# Patient Record
Sex: MALE | Employment: PART TIME | URBAN - METROPOLITAN AREA
[De-identification: names, ages, dates, MRNs, and addresses within clinical notes are randomized per-mention and may not be internally consistent; named-entity substitution may affect disease eponyms.]

---

## 2018-11-07 ENCOUNTER — HOSPITAL ENCOUNTER (EMERGENCY)
Age: 38
Discharge: HOME | End: 2018-11-07
Attending: EMERGENCY MEDICINE
Payer: SUBSIDIZED

## 2018-11-07 VITALS
HEIGHT: 67 IN | HEART RATE: 86 BPM | BODY MASS INDEX: 25.9 KG/M2 | OXYGEN SATURATION: 97 % | RESPIRATION RATE: 16 BRPM | SYSTOLIC BLOOD PRESSURE: 124 MMHG | DIASTOLIC BLOOD PRESSURE: 85 MMHG | TEMPERATURE: 99.5 F | WEIGHT: 165 LBS

## 2018-11-07 DIAGNOSIS — R42 DIZZINESS: Primary | ICD-10-CM

## 2018-11-07 LAB
ANION GAP SERPL CALC-SCNC: 10 MMOL/L (ref 5–15)
APPEARANCE UR: CLEAR
ATRIAL RATE: 93 BPM
BACTERIA URNS QL MICRO: NEGATIVE /HPF
BASOPHILS # BLD: 0.1 K/UL (ref 0–0.1)
BASOPHILS NFR BLD: 1 % (ref 0–1)
BILIRUB UR QL: NEGATIVE
BUN SERPL-MCNC: 18 MG/DL (ref 6–20)
BUN/CREAT SERPL: 18 (ref 12–20)
CALCIUM SERPL-MCNC: 9.9 MG/DL (ref 8.5–10.1)
CALCULATED P AXIS, ECG09: 52 DEGREES
CALCULATED R AXIS, ECG10: 78 DEGREES
CALCULATED T AXIS, ECG11: -2 DEGREES
CHLORIDE SERPL-SCNC: 106 MMOL/L (ref 97–108)
CO2 SERPL-SCNC: 21 MMOL/L (ref 21–32)
COLOR UR: NORMAL
COMMENT, HOLDF: NORMAL
CREAT SERPL-MCNC: 1.02 MG/DL (ref 0.7–1.3)
D DIMER PPP FEU-MCNC: <0.19 MG/L FEU (ref 0–0.65)
DIAGNOSIS, 93000: NORMAL
DIFFERENTIAL METHOD BLD: ABNORMAL
EOSINOPHIL # BLD: 0.1 K/UL (ref 0–0.4)
EOSINOPHIL NFR BLD: 2 % (ref 0–7)
EPITH CASTS URNS QL MICRO: NORMAL /LPF
ERYTHROCYTE [DISTWIDTH] IN BLOOD BY AUTOMATED COUNT: 12.1 % (ref 11.5–14.5)
GLUCOSE BLD STRIP.AUTO-MCNC: 117 MG/DL (ref 65–100)
GLUCOSE SERPL-MCNC: 114 MG/DL (ref 65–100)
GLUCOSE UR STRIP.AUTO-MCNC: NEGATIVE MG/DL
HCT VFR BLD AUTO: 46.8 % (ref 36.6–50.3)
HGB BLD-MCNC: 16 G/DL (ref 12.1–17)
HGB UR QL STRIP: NEGATIVE
HYALINE CASTS URNS QL MICRO: NORMAL /LPF (ref 0–5)
IMM GRANULOCYTES # BLD: 0.1 K/UL (ref 0–0.04)
IMM GRANULOCYTES NFR BLD AUTO: 1 % (ref 0–0.5)
KETONES UR QL STRIP.AUTO: NEGATIVE MG/DL
LEUKOCYTE ESTERASE UR QL STRIP.AUTO: NEGATIVE
LYMPHOCYTES # BLD: 2.3 K/UL (ref 0.8–3.5)
LYMPHOCYTES NFR BLD: 32 % (ref 12–49)
MCH RBC QN AUTO: 29.9 PG (ref 26–34)
MCHC RBC AUTO-ENTMCNC: 34.2 G/DL (ref 30–36.5)
MCV RBC AUTO: 87.3 FL (ref 80–99)
MONOCYTES # BLD: 0.5 K/UL (ref 0–1)
MONOCYTES NFR BLD: 6 % (ref 5–13)
NEUTS SEG # BLD: 4.3 K/UL (ref 1.8–8)
NEUTS SEG NFR BLD: 59 % (ref 32–75)
NITRITE UR QL STRIP.AUTO: NEGATIVE
NRBC # BLD: 0 K/UL (ref 0–0.01)
NRBC BLD-RTO: 0 PER 100 WBC
P-R INTERVAL, ECG05: 138 MS
PH UR STRIP: 6 [PH] (ref 5–8)
PLATELET # BLD AUTO: 206 K/UL (ref 150–400)
PMV BLD AUTO: 12.1 FL (ref 8.9–12.9)
POTASSIUM SERPL-SCNC: 4.5 MMOL/L (ref 3.5–5.1)
PROT UR STRIP-MCNC: NEGATIVE MG/DL
Q-T INTERVAL, ECG07: 350 MS
QRS DURATION, ECG06: 84 MS
QTC CALCULATION (BEZET), ECG08: 435 MS
RBC # BLD AUTO: 5.36 M/UL (ref 4.1–5.7)
RBC #/AREA URNS HPF: NORMAL /HPF (ref 0–5)
SAMPLES BEING HELD,HOLD: NORMAL
SERVICE CMNT-IMP: ABNORMAL
SODIUM SERPL-SCNC: 137 MMOL/L (ref 136–145)
SP GR UR REFRACTOMETRY: 1.02 (ref 1–1.03)
TROPONIN I SERPL-MCNC: <0.05 NG/ML
UROBILINOGEN UR QL STRIP.AUTO: 0.2 EU/DL (ref 0.2–1)
VENTRICULAR RATE, ECG03: 93 BPM
WBC # BLD AUTO: 7.4 K/UL (ref 4.1–11.1)
WBC URNS QL MICRO: NORMAL /HPF (ref 0–4)

## 2018-11-07 PROCEDURE — 99284 EMERGENCY DEPT VISIT MOD MDM: CPT

## 2018-11-07 PROCEDURE — 82962 GLUCOSE BLOOD TEST: CPT

## 2018-11-07 PROCEDURE — 36415 COLL VENOUS BLD VENIPUNCTURE: CPT | Performed by: EMERGENCY MEDICINE

## 2018-11-07 PROCEDURE — 74011250636 HC RX REV CODE- 250/636: Performed by: PHYSICIAN ASSISTANT

## 2018-11-07 PROCEDURE — 85379 FIBRIN DEGRADATION QUANT: CPT | Performed by: EMERGENCY MEDICINE

## 2018-11-07 PROCEDURE — 85025 COMPLETE CBC W/AUTO DIFF WBC: CPT | Performed by: PHYSICIAN ASSISTANT

## 2018-11-07 PROCEDURE — 93005 ELECTROCARDIOGRAM TRACING: CPT

## 2018-11-07 PROCEDURE — 80048 BASIC METABOLIC PNL TOTAL CA: CPT | Performed by: PHYSICIAN ASSISTANT

## 2018-11-07 PROCEDURE — 84484 ASSAY OF TROPONIN QUANT: CPT | Performed by: PHYSICIAN ASSISTANT

## 2018-11-07 PROCEDURE — 96360 HYDRATION IV INFUSION INIT: CPT

## 2018-11-07 PROCEDURE — 81001 URINALYSIS AUTO W/SCOPE: CPT | Performed by: PHYSICIAN ASSISTANT

## 2018-11-07 RX ADMIN — SODIUM CHLORIDE 1000 ML: 900 INJECTION, SOLUTION INTRAVENOUS at 10:03

## 2018-11-07 NOTE — PROGRESS NOTES
**[employer]** 
[employer] If patient needs to be admitted and STABLE- will need to be transferred to [facility] facility.

## 2018-11-07 NOTE — ED PROVIDER NOTES
43 y.o. male with past medical history significant for chronic low back pain presents with complaints of dizziness, fatigue, thrush on tongue, and bilateral blurry vision. The pt states that he has been experiencing these symptoms intermittently for the last year. He explained that while he was at work today he experienced worsening symptoms and wanted to be seen in the ED. Pt denies facial asymmetry, dysarthria, ataxia, unilateral weakness, or unilateral vision changes. There are no other acute medical complaints at this time. PCP: DO Liz Thakkar PA-C Past Medical History:  
Diagnosis Date  Chronic pain   
 back pain Past Surgical History:  
Procedure Laterality Date  HX HERNIA REPAIR Left   
 inguinal hernia repair  HX HERNIA REPAIR    
 as an infant No family history on file. Social History Socioeconomic History  Marital status: SINGLE Spouse name: Not on file  Number of children: Not on file  Years of education: Not on file  Highest education level: Not on file Social Needs  Financial resource strain: Not on file  Food insecurity - worry: Not on file  Food insecurity - inability: Not on file  Transportation needs - medical: Not on file  Transportation needs - non-medical: Not on file Occupational History  Not on file Tobacco Use  Smoking status: Former Smoker Years: 15.00  Smokeless tobacco: Never Used  Tobacco comment: 1 pack/week Substance and Sexual Activity  Alcohol use: Yes Alcohol/week: 0.0 oz Types: 4 - 5 Shots of liquor per week  Drug use: No  
 Sexual activity: Not on file Other Topics Concern  Not on file Social History Narrative  Not on file ALLERGIES: Benadryl [diphenhydramine hcl] Review of Systems Constitutional: Negative for chills, diaphoresis and fever. HENT: Negative for congestion, postnasal drip, rhinorrhea and sore throat. Eyes: Negative for photophobia, discharge, redness and visual disturbance. Respiratory: Negative for cough, chest tightness, shortness of breath and wheezing. Cardiovascular: Negative for chest pain, palpitations and leg swelling. Gastrointestinal: Negative for abdominal distention, abdominal pain, blood in stool, constipation, diarrhea, nausea and vomiting. Genitourinary: Negative for difficulty urinating, dysuria, frequency, hematuria and urgency. Musculoskeletal: Negative for arthralgias, back pain, joint swelling and myalgias. Skin: Negative for color change and rash. Neurological: Negative for dizziness, speech difficulty, weakness, light-headedness, numbness and headaches. Psychiatric/Behavioral: Negative for confusion. The patient is not nervous/anxious. All other systems reviewed and are negative. Vitals:  
 11/07/18 0929 11/07/18 0934 11/07/18 1147 BP:  (!) 150/96 124/85 Pulse: (!) 115 100 86 Resp:  16 16 Temp:  98.5 °F (36.9 °C) 99.5 °F (37.5 °C) SpO2: 97% 97% 97% Weight:  74.8 kg (165 lb) Height:  5' 7\" (1.702 m) Physical Exam  
Constitutional: He is oriented to person, place, and time. He appears well-developed and well-nourished. No distress. HENT:  
Head: Normocephalic and atraumatic. Head is without raccoon's eyes, without Escamilla's sign and without laceration. Right Ear: Hearing, tympanic membrane, external ear and ear canal normal. No foreign bodies. Tympanic membrane is not bulging. No hemotympanum. Left Ear: Hearing, tympanic membrane, external ear and ear canal normal. No foreign bodies. Tympanic membrane is not bulging. No hemotympanum. Nose: Nose normal. No mucosal edema or rhinorrhea. Right sinus exhibits no maxillary sinus tenderness and no frontal sinus tenderness. Left sinus exhibits no maxillary sinus tenderness and no frontal sinus tenderness.   
Mouth/Throat: Uvula is midline, oropharynx is clear and moist and mucous membranes are normal. No tonsillar abscesses. Eyes: Conjunctivae and EOM are normal. Pupils are equal, round, and reactive to light. Right eye exhibits no discharge. Left eye exhibits no discharge. Neck: Normal range of motion. Neck supple. Cardiovascular: Normal rate, regular rhythm and normal heart sounds. Exam reveals no gallop and no friction rub. No murmur heard. Regular rate and rhythm. No murmurs, gallops, rubs, or clicks. Pulmonary/Chest: Effort normal and breath sounds normal. No respiratory distress. He has no wheezes. He has no rales. No stridor or wheezes. No accessory muscle usage. No nasal flaring. Breath Sounds equal bilaterally. Abdominal: Soft. Bowel sounds are normal. He exhibits no distension. There is no tenderness. There is no rebound and no guarding. No abdominal Bruits. No pulsatile mass. No abdominal scars. Active bowel sounds. Musculoskeletal: Normal range of motion. He exhibits no edema, tenderness or deformity. Neurological: He is alert and oriented to person, place, and time. Skin: He is not diaphoretic. Nursing note and vitals reviewed. MDM Number of Diagnoses or Management Options Dizziness:  
Diagnosis management comments: Pt afebrile and nontoxic appearing. Labs, physical exam, and imaging studies all unremarkable. HEART score 2. No signs of PE, PTX, ACS, esophageal rupture, dissection, pancreatitis, appendicitis, cholecystitis/cholagnitis, bowel obstruction/perforation, sepsis or any other acute life threatening condition. Will treat symptomatically and advise close follow up with family doctor. Reviewed treatment plan with attending and they agree. Janett Neely PA-C Procedures

## 2018-11-07 NOTE — DISCHARGE INSTRUCTIONS
Dizziness: Care Instructions  Your Care Instructions  Dizziness is the feeling of unsteadiness or fuzziness in your head. It is different than having vertigo, which is a feeling that the room is spinning or that you are moving or falling. It is also different from lightheadedness, which is the feeling that you are about to faint. It can be hard to know what causes dizziness. Some people feel dizzy when they have migraine headaches. Sometimes bouts of flu can make you feel dizzy. Some medical conditions, such as heart problems or high blood pressure, can make you feel dizzy. Many medicines can cause dizziness, including medicines for high blood pressure, pain, or anxiety. If a medicine causes your symptoms, your doctor may recommend that you stop or change the medicine. If it is a problem with your heart, you may need medicine to help your heart work better. If there is no clear reason for your symptoms, your doctor may suggest watching and waiting for a while to see if the dizziness goes away on its own. Follow-up care is a key part of your treatment and safety. Be sure to make and go to all appointments, and call your doctor if you are having problems. It's also a good idea to know your test results and keep a list of the medicines you take. How can you care for yourself at home? · If your doctor recommends or prescribes medicine, take it exactly as directed. Call your doctor if you think you are having a problem with your medicine. · Do not drive while you feel dizzy. · Try to prevent falls. Steps you can take include:  ? Using nonskid mats, adding grab bars near the tub, and using night-lights. ? Clearing your home so that walkways are free of anything you might trip on.  ? Letting family and friends know that you have been feeling dizzy. This will help them know how to help you. When should you call for help? Call 911 anytime you think you may need emergency care.  For example, call if:    · You passed out (lost consciousness).     · You have dizziness along with symptoms of a heart attack. These may include:  ? Chest pain or pressure, or a strange feeling in the chest.  ? Sweating. ? Shortness of breath. ? Nausea or vomiting. ? Pain, pressure, or a strange feeling in the back, neck, jaw, or upper belly or in one or both shoulders or arms. ? Lightheadedness or sudden weakness. ? A fast or irregular heartbeat.     · You have symptoms of a stroke. These may include:  ? Sudden numbness, tingling, weakness, or loss of movement in your face, arm, or leg, especially on only one side of your body. ? Sudden vision changes. ? Sudden trouble speaking. ? Sudden confusion or trouble understanding simple statements. ? Sudden problems with walking or balance. ? A sudden, severe headache that is different from past headaches.    Call your doctor now or seek immediate medical care if:    · You feel dizzy and have a fever, headache, or ringing in your ears.     · You have new or increased nausea and vomiting.     · Your dizziness does not go away or comes back.    Watch closely for changes in your health, and be sure to contact your doctor if:    · You do not get better as expected. Where can you learn more? Go to http://dane-kristen.info/. Enter A590 in the search box to learn more about \"Dizziness: Care Instructions. \"  Current as of: November 20, 2017  Content Version: 11.8  © 8267-0199 [employer]. Care instructions adapted under license by [employer] (which disclaims liability or warranty for this information). If you have questions about a medical condition or this instruction, always ask your healthcare professional. [place] [address] any warranty or liability for your use of this information. We hope that we have addressed all of your medical concerns.  The examination and treatment you received in the Emergency Department were for an emergent problem and were not intended as complete care. It is important that you follow up with your healthcare provider(s) for ongoing care. If your symptoms worsen or do not improve as expected, and you are unable to reach your usual health care provider(s), you should return to the Emergency Department. Today's healthcare is undergoing tremendous change, and patient satisfaction surveys are one of the many tools to assess the quality of medical care. You may receive a survey from the [employer] organization regarding your experience in the Emergency Department. I hope that your experience has been completely positive, particularly the medical care that I provided. As such, please participate in the survey; anything less than excellent does not meet my expectations or intentions. [facility] and [facility] participate in nationally recognized quality of care measures. If your blood pressure is greater than 120/80, as reported below, we urge that you seek medical care to address the potential of high blood pressure, commonly known as hypertension. Hypertension can be hereditary or can be caused by certain medical conditions, pain, stress, or \"white coat syndrome. \"       Please make an appointment with your health care provider(s) for follow up of your Emergency Department visit. VITALS:   Patient Vitals for the past 8 hrs:   Temp Pulse Resp BP SpO2   11/07/18 1147 99.5 °F (37.5 °C) 86 16 124/85 97 %   11/07/18 0934 98.5 °F (36.9 °C) 100 16 (!) 150/96 97 %   11/07/18 0929 -- (!) 115 -- -- 97 %          Thank you for allowing us to provide you with medical care today. We realize that you have many choices for your emergency care needs. Please choose us in the future for any continued health care needs. Carroll De Jesus, [address]   Office: [phone]            Recent Results (from the past 24 hour(s))   EKG, 12 LEAD, INITIAL    Collection Time: 11/07/18  9:43 AM   Result Value Ref Range    Ventricular Rate 93 BPM    Atrial Rate 93 BPM    P-R Interval 138 ms    QRS Duration 84 ms    Q-T Interval 350 ms    QTC Calculation (Bezet) 435 ms    Calculated P Axis 52 degrees    Calculated R Axis 78 degrees    Calculated T Axis -2 degrees    Diagnosis       Normal sinus rhythm  Nonspecific T wave abnormality  When compared with ECG of 11-DEC-2015 14:37,  No significant change was found     CBC WITH AUTOMATED DIFF    Collection Time: 11/07/18  9:55 AM   Result Value Ref Range    WBC 7.4 4.1 - 11.1 K/uL    RBC 5.36 4.10 - 5.70 M/uL    HGB 16.0 12.1 - 17.0 g/dL    HCT 46.8 36.6 - 50.3 %    MCV 87.3 80.0 - 99.0 FL    MCH 29.9 26.0 - 34.0 PG    MCHC 34.2 30.0 - 36.5 g/dL    RDW 12.1 11.5 - 14.5 %    PLATELET 353 613 - 688 K/uL    MPV 12.1 8.9 - 12.9 FL    NRBC 0.0 0  WBC    ABSOLUTE NRBC 0.00 0.00 - 0.01 K/uL    NEUTROPHILS 59 32 - 75 %    LYMPHOCYTES 32 12 - 49 %    MONOCYTES 6 5 - 13 %    EOSINOPHILS 2 0 - 7 %    BASOPHILS 1 0 - 1 %    IMMATURE GRANULOCYTES 1 (H) 0.0 - 0.5 %    ABS. NEUTROPHILS 4.3 1.8 - 8.0 K/UL    ABS. LYMPHOCYTES 2.3 0.8 - 3.5 K/UL    ABS. MONOCYTES 0.5 0.0 - 1.0 K/UL    ABS. EOSINOPHILS 0.1 0.0 - 0.4 K/UL    ABS. BASOPHILS 0.1 0.0 - 0.1 K/UL    ABS. IMM.  GRANS. 0.1 (H) 0.00 - 0.04 K/UL    DF AUTOMATED     METABOLIC PANEL, BASIC    Collection Time: 11/07/18  9:55 AM   Result Value Ref Range    Sodium 137 136 - 145 mmol/L    Potassium 4.5 3.5 - 5.1 mmol/L    Chloride 106 97 - 108 mmol/L    CO2 21 21 - 32 mmol/L    Anion gap 10 5 - 15 mmol/L    Glucose 114 (H) 65 - 100 mg/dL    BUN 18 6 - 20 MG/DL    Creatinine 1.02 0.70 - 1.30 MG/DL    BUN/Creatinine ratio 18 12 - 20      GFR est AA >60 >60 ml/min/1.73m2    GFR est non-AA >60 >60 ml/min/1.73m2    Calcium 9.9 8.5 - 10.1 MG/DL   TROPONIN I    Collection Time: 11/07/18  9:55 AM   Result Value Ref Range    Troponin-I, Qt. <0.05 <0.05 ng/mL SAMPLES BEING HELD    Collection Time: 11/07/18  9:55 AM   Result Value Ref Range    SAMPLES BEING HELD 1RED      COMMENT        Add-on orders for these samples will be processed based on acceptable specimen integrity and analyte stability, which may vary by analyte. GLUCOSE, POC    Collection Time: 11/07/18 10:02 AM   Result Value Ref Range    Glucose (POC) 117 (H) 65 - 100 mg/dL    Performed by Shania Padgett W/MICROSCOPIC    Collection Time: 11/07/18 10:40 AM   Result Value Ref Range    Color YELLOW/STRAW      Appearance CLEAR CLEAR      Specific gravity 1.025 1.003 - 1.030      pH (UA) 6.0 5.0 - 8.0      Protein NEGATIVE  NEG mg/dL    Glucose NEGATIVE  NEG mg/dL    Ketone NEGATIVE  NEG mg/dL    Bilirubin NEGATIVE  NEG      Blood NEGATIVE  NEG      Urobilinogen 0.2 0.2 - 1.0 EU/dL    Nitrites NEGATIVE  NEG      Leukocyte Esterase NEGATIVE  NEG      WBC 0-4 0 - 4 /hpf    RBC 0-5 0 - 5 /hpf    Epithelial cells FEW FEW /lpf    Bacteria NEGATIVE  NEG /hpf    Hyaline cast 0-2 0 - 5 /lpf   D DIMER    Collection Time: 11/07/18 10:40 AM   Result Value Ref Range    D-dimer <0.19 0.00 - 0.65 mg/L FEU       No results found.

## 2019-03-28 ENCOUNTER — APPOINTMENT (OUTPATIENT)
Dept: MRI IMAGING | Age: 39
End: 2019-03-28
Attending: NURSE PRACTITIONER
Payer: SUBSIDIZED

## 2019-03-28 ENCOUNTER — HOSPITAL ENCOUNTER (OUTPATIENT)
Age: 39
Setting detail: OBSERVATION
Discharge: HOME | End: 2019-03-29
Attending: EMERGENCY MEDICINE | Admitting: INTERNAL MEDICINE
Payer: SUBSIDIZED

## 2019-03-28 ENCOUNTER — APPOINTMENT (OUTPATIENT)
Dept: CT IMAGING | Age: 39
End: 2019-03-28
Attending: EMERGENCY MEDICINE
Payer: SUBSIDIZED

## 2019-03-28 ENCOUNTER — APPOINTMENT (OUTPATIENT)
Dept: NON INVASIVE DIAGNOSTICS | Age: 39
End: 2019-03-28
Attending: INTERNAL MEDICINE
Payer: SUBSIDIZED

## 2019-03-28 ENCOUNTER — HOSPITAL ENCOUNTER (OUTPATIENT)
Dept: MRI IMAGING | Age: 39
Discharge: HOME | End: 2019-03-28
Attending: INTERNAL MEDICINE
Payer: SUBSIDIZED

## 2019-03-28 DIAGNOSIS — M48.062 LUMBAR STENOSIS WITH NEUROGENIC CLAUDICATION: ICD-10-CM

## 2019-03-28 DIAGNOSIS — G45.9 TIA (TRANSIENT ISCHEMIC ATTACK): Primary | ICD-10-CM

## 2019-03-28 DIAGNOSIS — R47.81 SLURRED SPEECH: ICD-10-CM

## 2019-03-28 PROBLEM — R03.0 ELEVATED BLOOD PRESSURE READING: Status: ACTIVE | Noted: 2019-03-28

## 2019-03-28 LAB
ALBUMIN SERPL-MCNC: 4.3 G/DL (ref 3.5–5)
ALBUMIN/GLOB SERPL: 1.6 {RATIO} (ref 1.1–2.2)
ALP SERPL-CCNC: 42 U/L (ref 45–117)
ALT SERPL-CCNC: 32 U/L (ref 12–78)
AMPHET UR QL SCN: NEGATIVE
ANION GAP SERPL CALC-SCNC: 7 MMOL/L (ref 5–15)
APPEARANCE UR: ABNORMAL
AST SERPL-CCNC: 14 U/L (ref 15–37)
BACTERIA URNS QL MICRO: NEGATIVE /HPF
BARBITURATES UR QL SCN: NEGATIVE
BASOPHILS # BLD: 0.1 K/UL (ref 0–0.1)
BASOPHILS NFR BLD: 1 % (ref 0–1)
BENZODIAZ UR QL: NEGATIVE
BILIRUB SERPL-MCNC: 0.3 MG/DL (ref 0.2–1)
BILIRUB UR QL: NEGATIVE
BUN SERPL-MCNC: 14 MG/DL (ref 6–20)
BUN/CREAT SERPL: 16 (ref 12–20)
CALCIUM SERPL-MCNC: 8.7 MG/DL (ref 8.5–10.1)
CANNABINOIDS UR QL SCN: NEGATIVE
CHLORIDE SERPL-SCNC: 106 MMOL/L (ref 97–108)
CHOLEST SERPL-MCNC: 195 MG/DL
CO2 SERPL-SCNC: 27 MMOL/L (ref 21–32)
COCAINE UR QL SCN: NEGATIVE
COLOR UR: ABNORMAL
COMMENT, HOLDF: NORMAL
CREAT SERPL-MCNC: 0.9 MG/DL (ref 0.7–1.3)
CRP SERPL HS-MCNC: 0.6 MG/L
DIFFERENTIAL METHOD BLD: ABNORMAL
DRUG SCRN COMMENT,DRGCM: ABNORMAL
ECHO AO ROOT DIAM: 3.34 CM
ECHO AV AREA PEAK VELOCITY: 2.3 CM2
ECHO AV PEAK GRADIENT: 4.3 MMHG
ECHO AV PEAK VELOCITY: 103.77 CM/S
ECHO EST RA PRESSURE: 3 MMHG
ECHO LA MAJOR AXIS: 3.3 CM
ECHO LA TO AORTIC ROOT RATIO: 0.99
ECHO LA VOL 2C: 46.82 ML (ref 18–58)
ECHO LA VOL 4C: 42.86 ML (ref 18–58)
ECHO LA VOL BP: 48.2 ML (ref 18–58)
ECHO LA VOL/BSA BIPLANE: 25.41 ML/M2 (ref 16–28)
ECHO LA VOLUME INDEX A2C: 24.69 ML/M2 (ref 16–28)
ECHO LA VOLUME INDEX A4C: 22.6 ML/M2 (ref 16–28)
ECHO LV INTERNAL DIMENSION DIASTOLIC: 4.75 CM (ref 4.2–5.9)
ECHO LV INTERNAL DIMENSION SYSTOLIC: 3.4 CM
ECHO LV IVSD: 0.83 CM (ref 0.6–1)
ECHO LV MASS 2D: 134.4 G (ref 88–224)
ECHO LV MASS INDEX 2D: 70.9 G/M2 (ref 49–115)
ECHO LV POSTERIOR WALL DIASTOLIC: 0.72 CM (ref 0.6–1)
ECHO LVOT DIAM: 1.96 CM
ECHO LVOT PEAK GRADIENT: 2.5 MMHG
ECHO LVOT PEAK VELOCITY: 78.95 CM/S
ECHO MV A VELOCITY: 50.8 CM/S
ECHO MV E DECELERATION TIME (DT): 198.1 MS
ECHO MV E VELOCITY: 80.24 CM/S
ECHO MV E/A RATIO: 1.58
ECHO MV REGURGITANT PEAK GRADIENT: 35.7 MMHG
ECHO MV REGURGITANT PEAK VELOCITY: 298.94 CM/S
ECHO PULMONARY ARTERY SYSTOLIC PRESSURE (PASP): 26.5 MMHG
ECHO RIGHT VENTRICULAR SYSTOLIC PRESSURE (RVSP): 26.5 MMHG
ECHO RV INTERNAL DIMENSION: 3.49 CM
ECHO TV REGURGITANT MAX VELOCITY: 242.44 CM/S
ECHO TV REGURGITANT PEAK GRADIENT: 23.5 MMHG
EOSINOPHIL # BLD: 0.3 K/UL (ref 0–0.4)
EOSINOPHIL NFR BLD: 5 % (ref 0–7)
EPITH CASTS URNS QL MICRO: ABNORMAL /LPF
ERYTHROCYTE [DISTWIDTH] IN BLOOD BY AUTOMATED COUNT: 12.6 % (ref 11.5–14.5)
EST. AVERAGE GLUCOSE BLD GHB EST-MCNC: 114 MG/DL
ETHANOL SERPL-MCNC: <10 MG/DL
GLOBULIN SER CALC-MCNC: 2.7 G/DL (ref 2–4)
GLUCOSE BLD STRIP.AUTO-MCNC: 107 MG/DL (ref 65–100)
GLUCOSE SERPL-MCNC: 100 MG/DL (ref 65–100)
GLUCOSE UR STRIP.AUTO-MCNC: NEGATIVE MG/DL
HBA1C MFR BLD: 5.6 % (ref 4.2–6.3)
HCT VFR BLD AUTO: 42.9 % (ref 36.6–50.3)
HDLC SERPL-MCNC: 45 MG/DL
HDLC SERPL: 4.3 {RATIO} (ref 0–5)
HGB BLD-MCNC: 14.4 G/DL (ref 12.1–17)
HGB UR QL STRIP: NEGATIVE
HYALINE CASTS URNS QL MICRO: ABNORMAL /LPF (ref 0–5)
IMM GRANULOCYTES # BLD AUTO: 0 K/UL (ref 0–0.04)
IMM GRANULOCYTES NFR BLD AUTO: 1 % (ref 0–0.5)
KETONES UR QL STRIP.AUTO: NEGATIVE MG/DL
LDLC SERPL CALC-MCNC: 106.4 MG/DL (ref 0–100)
LEUKOCYTE ESTERASE UR QL STRIP.AUTO: NEGATIVE
LIPID PROFILE,FLP: ABNORMAL
LYMPHOCYTES # BLD: 2.3 K/UL (ref 0.8–3.5)
LYMPHOCYTES NFR BLD: 42 % (ref 12–49)
MCH RBC QN AUTO: 30.2 PG (ref 26–34)
MCHC RBC AUTO-ENTMCNC: 33.6 G/DL (ref 30–36.5)
MCV RBC AUTO: 89.9 FL (ref 80–99)
METHADONE UR QL: NEGATIVE
MONOCYTES # BLD: 0.3 K/UL (ref 0–1)
MONOCYTES NFR BLD: 6 % (ref 5–13)
NEUTS SEG # BLD: 2.5 K/UL (ref 1.8–8)
NEUTS SEG NFR BLD: 45 % (ref 32–75)
NITRITE UR QL STRIP.AUTO: NEGATIVE
NRBC # BLD: 0 K/UL (ref 0–0.01)
NRBC BLD-RTO: 0 PER 100 WBC
OPIATES UR QL: POSITIVE
PCP UR QL: NEGATIVE
PH UR STRIP: 6 [PH] (ref 5–8)
PLATELET # BLD AUTO: 213 K/UL (ref 150–400)
PMV BLD AUTO: 11.4 FL (ref 8.9–12.9)
POTASSIUM SERPL-SCNC: 3.4 MMOL/L (ref 3.5–5.1)
PROT SERPL-MCNC: 7 G/DL (ref 6.4–8.2)
PROT UR STRIP-MCNC: ABNORMAL MG/DL
RBC # BLD AUTO: 4.77 M/UL (ref 4.1–5.7)
RBC #/AREA URNS HPF: ABNORMAL /HPF (ref 0–5)
SAMPLES BEING HELD,HOLD: NORMAL
SERVICE CMNT-IMP: ABNORMAL
SODIUM SERPL-SCNC: 140 MMOL/L (ref 136–145)
SP GR UR REFRACTOMETRY: 1.03 (ref 1–1.03)
TRIGL SERPL-MCNC: 218 MG/DL (ref ?–150)
TSH SERPL DL<=0.05 MIU/L-ACNC: 0.6 UIU/ML (ref 0.36–3.74)
UROBILINOGEN UR QL STRIP.AUTO: 0.2 EU/DL (ref 0.2–1)
VIT B12 SERPL-MCNC: 632 PG/ML (ref 193–986)
VLDLC SERPL CALC-MCNC: 43.6 MG/DL
WBC # BLD AUTO: 5.5 K/UL (ref 4.1–11.1)
WBC URNS QL MICRO: ABNORMAL /HPF (ref 0–4)

## 2019-03-28 PROCEDURE — 97116 GAIT TRAINING THERAPY: CPT

## 2019-03-28 PROCEDURE — 86141 C-REACTIVE PROTEIN HS: CPT

## 2019-03-28 PROCEDURE — 70450 CT HEAD/BRAIN W/O DYE: CPT

## 2019-03-28 PROCEDURE — 80061 LIPID PANEL: CPT

## 2019-03-28 PROCEDURE — 93005 ELECTROCARDIOGRAM TRACING: CPT

## 2019-03-28 PROCEDURE — 81001 URINALYSIS AUTO W/SCOPE: CPT

## 2019-03-28 PROCEDURE — 99218 HC RM OBSERVATION: CPT

## 2019-03-28 PROCEDURE — 97161 PT EVAL LOW COMPLEX 20 MIN: CPT

## 2019-03-28 PROCEDURE — 74011250637 HC RX REV CODE- 250/637: Performed by: NURSE PRACTITIONER

## 2019-03-28 PROCEDURE — 82607 VITAMIN B-12: CPT

## 2019-03-28 PROCEDURE — 85025 COMPLETE CBC W/AUTO DIFF WBC: CPT

## 2019-03-28 PROCEDURE — 80307 DRUG TEST PRSMV CHEM ANLYZR: CPT

## 2019-03-28 PROCEDURE — 93306 TTE W/DOPPLER COMPLETE: CPT

## 2019-03-28 PROCEDURE — 74011250637 HC RX REV CODE- 250/637: Performed by: INTERNAL MEDICINE

## 2019-03-28 PROCEDURE — 84443 ASSAY THYROID STIM HORMONE: CPT

## 2019-03-28 PROCEDURE — 92610 EVALUATE SWALLOWING FUNCTION: CPT

## 2019-03-28 PROCEDURE — 70551 MRI BRAIN STEM W/O DYE: CPT

## 2019-03-28 PROCEDURE — 36415 COLL VENOUS BLD VENIPUNCTURE: CPT

## 2019-03-28 PROCEDURE — 72148 MRI LUMBAR SPINE W/O DYE: CPT

## 2019-03-28 PROCEDURE — 80053 COMPREHEN METABOLIC PANEL: CPT

## 2019-03-28 PROCEDURE — 87086 URINE CULTURE/COLONY COUNT: CPT

## 2019-03-28 PROCEDURE — 99285 EMERGENCY DEPT VISIT HI MDM: CPT

## 2019-03-28 PROCEDURE — 74011250637 HC RX REV CODE- 250/637: Performed by: PSYCHIATRY & NEUROLOGY

## 2019-03-28 PROCEDURE — 83036 HEMOGLOBIN GLYCOSYLATED A1C: CPT

## 2019-03-28 PROCEDURE — 82962 GLUCOSE BLOOD TEST: CPT

## 2019-03-28 PROCEDURE — 74011250636 HC RX REV CODE- 250/636: Performed by: INTERNAL MEDICINE

## 2019-03-28 RX ORDER — GUAIFENESIN 100 MG/5ML
81 LIQUID (ML) ORAL DAILY
Status: DISCONTINUED | OUTPATIENT
Start: 2019-03-28 | End: 2019-03-29 | Stop reason: HOSPADM

## 2019-03-28 RX ORDER — ACETAMINOPHEN 325 MG/1
650 TABLET ORAL
Status: DISCONTINUED | OUTPATIENT
Start: 2019-03-28 | End: 2019-03-29 | Stop reason: HOSPADM

## 2019-03-28 RX ORDER — SODIUM CHLORIDE 0.9 % (FLUSH) 0.9 %
5-40 SYRINGE (ML) INJECTION AS NEEDED
Status: DISCONTINUED | OUTPATIENT
Start: 2019-03-28 | End: 2019-03-29 | Stop reason: HOSPADM

## 2019-03-28 RX ORDER — ENOXAPARIN SODIUM 100 MG/ML
40 INJECTION SUBCUTANEOUS EVERY 24 HOURS
Status: DISCONTINUED | OUTPATIENT
Start: 2019-03-28 | End: 2019-03-29 | Stop reason: HOSPADM

## 2019-03-28 RX ORDER — LORAZEPAM 1 MG/1
2 TABLET ORAL ONCE
Status: COMPLETED | OUTPATIENT
Start: 2019-03-28 | End: 2019-03-28

## 2019-03-28 RX ORDER — THERA TABS 400 MCG
1 TAB ORAL DAILY
COMMUNITY

## 2019-03-28 RX ORDER — GUAIFENESIN 100 MG/5ML
81 LIQUID (ML) ORAL DAILY
Status: DISCONTINUED | OUTPATIENT
Start: 2019-03-29 | End: 2019-03-28

## 2019-03-28 RX ORDER — POTASSIUM CHLORIDE AND SODIUM CHLORIDE 900; 300 MG/100ML; MG/100ML
INJECTION, SOLUTION INTRAVENOUS CONTINUOUS
Status: DISCONTINUED | OUTPATIENT
Start: 2019-03-28 | End: 2019-03-29 | Stop reason: HOSPADM

## 2019-03-28 RX ORDER — OXYCODONE AND ACETAMINOPHEN 7.5; 325 MG/1; MG/1
1 TABLET ORAL
COMMUNITY
End: 2020-03-06

## 2019-03-28 RX ORDER — SODIUM CHLORIDE 0.9 % (FLUSH) 0.9 %
5-40 SYRINGE (ML) INJECTION EVERY 8 HOURS
Status: DISCONTINUED | OUTPATIENT
Start: 2019-03-28 | End: 2019-03-29 | Stop reason: HOSPADM

## 2019-03-28 RX ORDER — POTASSIUM CHLORIDE 750 MG/1
40 TABLET, FILM COATED, EXTENDED RELEASE ORAL EVERY 4 HOURS
Status: DISPENSED | OUTPATIENT
Start: 2019-03-28 | End: 2019-03-28

## 2019-03-28 RX ORDER — FAMOTIDINE 20 MG/1
20 TABLET, FILM COATED ORAL 2 TIMES DAILY
Status: DISCONTINUED | OUTPATIENT
Start: 2019-03-28 | End: 2019-03-29 | Stop reason: HOSPADM

## 2019-03-28 RX ORDER — ONDANSETRON 2 MG/ML
4 INJECTION INTRAMUSCULAR; INTRAVENOUS
Status: DISCONTINUED | OUTPATIENT
Start: 2019-03-28 | End: 2019-03-29 | Stop reason: HOSPADM

## 2019-03-28 RX ORDER — ASPIRIN 325 MG
325 TABLET ORAL DAILY
Status: DISCONTINUED | OUTPATIENT
Start: 2019-03-28 | End: 2019-03-28

## 2019-03-28 RX ORDER — CARISOPRODOL 250 MG/1
250 TABLET ORAL
COMMUNITY
End: 2020-03-06

## 2019-03-28 RX ORDER — ATORVASTATIN CALCIUM 20 MG/1
40 TABLET, FILM COATED ORAL
Status: DISCONTINUED | OUTPATIENT
Start: 2019-03-28 | End: 2019-03-29 | Stop reason: HOSPADM

## 2019-03-28 RX ORDER — ALPRAZOLAM 1 MG/1
1 TABLET ORAL DAILY
COMMUNITY

## 2019-03-28 RX ADMIN — ATORVASTATIN CALCIUM 40 MG: 20 TABLET, FILM COATED ORAL at 22:33

## 2019-03-28 RX ADMIN — POTASSIUM CHLORIDE 40 MEQ: 750 TABLET, EXTENDED RELEASE ORAL at 14:48

## 2019-03-28 RX ADMIN — FAMOTIDINE 20 MG: 20 TABLET ORAL at 22:33

## 2019-03-28 RX ADMIN — POTASSIUM CHLORIDE 40 MEQ: 750 TABLET, EXTENDED RELEASE ORAL at 18:36

## 2019-03-28 RX ADMIN — ASPIRIN 81 MG 81 MG: 81 TABLET ORAL at 14:48

## 2019-03-28 RX ADMIN — LORAZEPAM 2 MG: 1 TABLET ORAL at 18:36

## 2019-03-28 RX ADMIN — SODIUM CHLORIDE AND POTASSIUM CHLORIDE: 9; 2.98 INJECTION, SOLUTION INTRAVENOUS at 15:02

## 2019-03-28 RX ADMIN — Medication 10 ML: at 22:33

## 2019-03-28 RX ADMIN — ENOXAPARIN SODIUM 40 MG: 100 INJECTION SUBCUTANEOUS at 22:33

## 2019-03-28 NOTE — PROGRESS NOTES
Please complete MRI History and Safety Screening Form for this patient Using [employer] only under Orders Requiring a Screening Form: 
Example: 
Orders Requiring a Screening Form Procedure                    Order Status                      Form Status MRI EXAM                   Active                                In Progress Click on blue hyperlink as shown above to launch MRI screening form Answer all questions completely including Weight, Surgery, and Implant History. Document MUST be \"eSigned\" using a \"Signature Pad\" by the person completing form.  (patient and RN or MD completing form with the patient) Patient cannot be scanned until this form is completed and reviewed in MRI to ensure patient is SAFE and eligible for MRI. Call MRI when this has been successfully completed at [phone]. This must be done under [employer]AR[employer]. Do not use the Nursing Flowsheet.

## 2019-03-28 NOTE — PROGRESS NOTES
physical Therapy neuro EVALUATION/discharge Patient: Lorne Santamaria (51 y.o. male) Date: 3/28/2019 Primary Diagnosis: Slurred speech [R47.81] Precautions:   Fall ASSESSMENT : 
Based on the objective data described below, the patient presents with slurred speech and weakness that has now resolved. Patient now with 5/5 strength in bilateral UE and LE. Patient without loss of balance or instability with upright activity. Patient is Independent for all upright mobility. He demonstrates no loss of balance. He was able to ambulate 250 feet with without an assistive device. Patient able to negotiate 4 steps with no hand rails. Patient scored a 56/56 on the KASPER. Patient does have chronic low back pain. Patient has been evaluated by neurology, CVA is unlikely per them. CT is negative, but awaiting MRI. Patient is currently at his baseline for mobility. Further skilled acute physical therapy is not indicated at this time. PLAN : 
Discharge Recommendations: None Further Equipment Recommendations for Discharge: none SUBJECTIVE:  
Patient stated i am better.  OBJECTIVE DATA SUMMARY:  
HISTORY:   
Past Medical History:  
Diagnosis Date  Chronic pain   
 back pain  GERD (gastroesophageal reflux disease) Past Surgical History:  
Procedure Laterality Date  HX HERNIA REPAIR Left   
 inguinal hernia repair  HX HERNIA REPAIR    
 as an infant Prior Level of Function/Home Situation: see above Personal factors and/or comorbidities impacting plan of care: see below Home Situation Home Environment: Private residence # Steps to Enter: 4 Rails to Enter: Yes Hand Rails : Left One/Two Story Residence: Two story # of Interior Steps: 15 Interior Rails: Both Lift Chair Available: No 
Living Alone: No 
Support Systems: Family member(s) Patient Expects to be Discharged to[de-identified] Private residence Current DME Used/Available at Home: None EXAMINATION/PRESENTATION/DECISION MAKING: Critical Behavior: 
Neurologic State: Alert Orientation Level: Oriented X4 Cognition: Appropriate decision making Safety/Judgement: Insight into deficits Hearing: Auditory Auditory Impairment: None Skin:  All exposed intact Edema: none noted Range Of Motion: 
AROM: Within functional limits PROM: Within functional limits Strength:   
Strength: Within functional limits Tone & Sensation:  
Tone: Normal 
  
  
  
  
Sensation: Intact Coordination: 
Coordination: Within functional limits Vision:  
  
Functional Mobility: 
Bed Mobility: 
Rolling: Independent Supine to Sit: Independent Sit to Supine: Independent Transfers: 
Sit to Stand: Independent Stand to Sit: Independent Bed to Chair: Independent Balance:  
Sitting: Intact Standing: Intact Ambulation/Gait Training: 
Distance (ft): 250 Feet (ft) Assistive Device: Gait belt Ambulation - Level of Assistance: Independent Gait Description (WDL): Exceptions to [facility] Stair Training: 
Number of Stairs Trained: 4 Stairs - Level of Assistance: Independent Rail Use: None Therapeutic Exercises:  
 
 
Functional Measure: 
Cobb Balance Test: 
 
Sitting to Standin Standing Unsupported: 4 Sitting with Back Unsupported: 4 Standing to Sittin Transfers: 4 Standing Unsupported with Eyes Closed: 4 Standing Unsupported with Feet Together: 4 Reach Forward with Outstretched Arm: 4  Object: 4 Turn to Look Over Shoulders: 4 Turn 360 Degrees: 4 Alternate Foot on Step/Stool: 4 Standing Unsupported One Foot in Front: 4 Stand on One Le Total: 56 
 
 
 
56=Maximum possible score;  
0-20=High fall risk 21-40=Moderate fall risk 41-56=Low fall risk Physical Therapy Evaluation Charge Determination History Examination Presentation Decision-Making MEDIUM  Complexity : 1-2 comorbidities / personal factors will impact the outcome/ POC  LOW Complexity : 1-2 Standardized tests and measures addressing body structure, function, activity limitation and / or participation in recreation  LOW Complexity : Stable, uncomplicated  Other outcome measures KASPER  LOW Based on the above components, the patient evaluation is determined to be of the following complexity level: LOW Pain: 
Pain Scale 1: Numeric (0 - 10) Pain Intensity 1: 0 Activity Tolerance: No complications with upright activity Please refer to the flowsheet for vital signs taken during this treatment. After treatment:  
[]         Patient left in no apparent distress sitting up in chair 
[x]         Patient left in no apparent distress in bed 
[x]         Call bell left within reach [x]         Nursing notified 
[x]         Caregiver present 
[]         Bed alarm activated COMMUNICATION/EDUCATION:  
Patient was educated regarding His deficit(s) of resolved self-reported slurred speech and weakness as this relates to His diagnosis of ?? Neurology has seen and said not TIA or CVA, but no diagnosis given. Patient and/or family was verbally educated on the BE FAST acronym for signs/symptoms of CVA and TIA. - no applicable, not CVA or TIA [x]   Fall prevention education was provided and the patient/caregiver indicated understanding. [x]   Patient/family have participated as able and agree with findings and recommendations. []   Patient is unable to participate in plan of care at this time. Findings and recommendations were discussed with: Registered Nurse Thank you for this referral. 
Ken Ware, PT, DPT Time Calculation: 15 mins

## 2019-03-28 NOTE — ED PROVIDER NOTES
35 y.o. male with past medical history significant for herniated disc, who presents ambulatory with family member to the ED with cc of dysarthria. Pt reports onset of dysarthria described as \"slurred speech,\" \"stuttering,\" and \"it was difficult to get my words out\" at ~8:30AM after he stepped out of the shower. He reports associated confusion, dizziness, and generalized weakness worst to his bilateral lower extremities equally, noting he felt \"shaky\" while ambulating. At present, he states his speech has improved since onset but is still not at baseline. Pt reports he had similar episode with milder symptoms ~4 months ago. Of note, he endorses history of herniated disc at L4-L5 and \"issues\" with cervical spine. He notes daily use of carbamazepine and percocet. Pt specifically denies any numbness, nausea, vomiting, chest pain, shortness of breath, fevers, cough, or congestion. There are no other acute medical concerns at this time. Social Hx: former Tobacco use, yes EtOH use, denies Illicit Drug use PCP: Jose Hutchison DO Note written by Hari Lieberman, as dictated by Tami Ambriz MD 9:34 AM 
 
 
The history is provided by the patient. No  was used. Past Medical History:  
Diagnosis Date  Chronic pain   
 back pain Past Surgical History:  
Procedure Laterality Date  HX HERNIA REPAIR Left   
 inguinal hernia repair  HX HERNIA REPAIR    
 as an infant No family history on file. Social History Socioeconomic History  Marital status: SINGLE Spouse name: Not on file  Number of children: Not on file  Years of education: Not on file  Highest education level: Not on file Occupational History  Not on file Social Needs  Financial resource strain: Not on file  Food insecurity:  
  Worry: Not on file Inability: Not on file  Transportation needs:  
  Medical: Not on file Non-medical: Not on file Tobacco Use  
  Smoking status: Former Smoker Years: 15.00  Smokeless tobacco: Never Used  Tobacco comment: 1 pack/week Substance and Sexual Activity  Alcohol use: Yes Alcohol/week: 0.0 oz Types: 4 - 5 Shots of liquor per week  Drug use: No  
 Sexual activity: Not on file Lifestyle  Physical activity:  
  Days per week: Not on file Minutes per session: Not on file  Stress: Not on file Relationships  Social connections:  
  Talks on phone: Not on file Gets together: Not on file Attends Druze service: Not on file Active member of club or organization: Not on file Attends meetings of clubs or organizations: Not on file Relationship status: Not on file  Intimate partner violence:  
  Fear of current or ex partner: Not on file Emotionally abused: Not on file Physically abused: Not on file Forced sexual activity: Not on file Other Topics Concern  Not on file Social History Narrative  Not on file ALLERGIES: Benadryl [diphenhydramine hcl] Review of Systems Constitutional: Negative for fever. HENT: Negative for congestion. Respiratory: Negative for cough and shortness of breath. Cardiovascular: Negative for chest pain. Gastrointestinal: Negative for nausea and vomiting. Neurological: Positive for dizziness, speech difficulty and weakness (BL lower extremities > generalized). Negative for numbness. Psychiatric/Behavioral: Positive for confusion. All other systems reviewed and are negative. Vitals:  
 03/28/19 9466 BP: (!) 168/105 Pulse: 97 Resp: 16 Temp: 99 °F (37.2 °C) SpO2: 100% Physical Exam  
Constitutional: He is oriented to person, place, and time. He appears well-developed and well-nourished. No distress. HENT:  
Head: Normocephalic and atraumatic. Eyes: Conjunctivae are normal. No scleral icterus. Neck: Neck supple. No tracheal deviation present. Cardiovascular: Normal rate, regular rhythm, normal heart sounds and intact distal pulses. Exam reveals no gallop and no friction rub. No murmur heard. Pulmonary/Chest: Effort normal and breath sounds normal. He has no wheezes. He has no rales. Abdominal: Soft. He exhibits no distension. There is no tenderness. There is no rebound and no guarding. Musculoskeletal: He exhibits no edema. Neurological: He is alert and oriented to person, place, and time. He has normal strength. No cranial nerve deficit or sensory deficit. Skin: Skin is warm and dry. No rash noted. Psychiatric: He has a normal mood and affect. Nursing note and vitals reviewed. Note written by Hari Marion, as dictated by Vinicio Demarco MD 9:34 AM 
 
MDM Procedures CONSULT NOTE: 
9:38 AM Vinicio Demarco MD spoke with Dr. Anurag Woodall for Neurology. Discussed available diagnostic tests and clinical findings. Dr. Tamy Jackson will evaluate pt via monitor. ED EKG interpretation: 
NSR; rate 88; normal ST, T.  
Note written by Cat Bowser, as dictated by Vinicio Demarco MD  9:58 AM 
 
10:24 AM 
Dr. Tamy Jackson called back; recommends admission for TIA evaluation. Roxana Peaks CONSULT NOTE: 
10:59 AM Vinicio Demarco MD spoke with Dr. Lamonte Bumpers, Consult for Hospitalist.  Discussed available diagnostic tests and clinical findings. Dr. Lamonte Bumpers will admit. ADMIT NOTE: 
11:00 AM 
Patient is being admitted to the hospital. 
 
11:07 AM 
Re-assessed pt. Pt reports he feels better now, almost at baseline, speech resolved.  
 
A/P: trouble with speech earlier - unclear etiology; normal neuro exam; CT neg; teleneurology evaluated and recommends admission for TIA eval.  Nick Altman MD

## 2019-03-28 NOTE — PROGRESS NOTES
[facility]: MED RECONCILIATION Comments/Recommendations:  
Patient alert and oriented for medication reconciliation. Oxycodone/APAP prescribed for ruptured discs per patient. Patient does not use his xanax very often, maybe once weekly. Confirmed allergy-patient states benadryl made him feel like his \"skin was crawling\" Updated preferred pharmacy as [facility] on [address]. Medications added:  
 
Oxycodone/APAP Carisoprodol MVI 
alprazolam 
 
Medications removed: 
 
Famotidine Hydrocodone/APAP Medications adjusted: 
 
none Information obtained from: patient, [place]  Allergies: Benadryl [diphenhydramine hcl] Prior to Admission Medications:  
 
 
Prior to Admission Medications Prescriptions Last Dose Informant Patient Reported? Taking? ALPRAZolam (XANAX) 1 mg tablet 3/27/2019 Self Yes Yes Sig: Take 0.5 mg by mouth daily as needed for Anxiety. carisoprodol (SOMA) 250 mg tablet 3/27/2019 Self Yes Yes Sig: Take 250 mg by mouth four (4) times daily as needed for Muscle Spasm(s). oxyCODONE-acetaminophen (PERCOCET 7.5) 7.5-325 mg per tablet 3/28/2019 at 0700 Self Yes Yes Sig: Take 1 Tab by mouth every four to six (4-6) hours as needed for Pain. therapeutic multivitamin (THERAGRAN) tablet 3/28/2019 at AM Self Yes Yes Sig: Take 1 Tab by mouth daily. Facility-Administered Medications: None Thank Kelly Burkett, PharmD, BCPS   Contact: [phone]

## 2019-03-28 NOTE — ED NOTES
Pt Throughput: Charge Nurse on 5th floor  made aware of patient's bed assignment, room. Twyla Ley RN Emergency Dept Charge RN.

## 2019-03-28 NOTE — ED NOTES
; pt in CT at [phone] from triage with primary RN, CT completed 2299; pt returned to room at [address]

## 2019-03-28 NOTE — ED NOTES
TRANSFER - OUT REPORT: 
 
Verbal report given to 5th floor RN (name) on Kacey Shearer  being transferred to [facility] (unit) for routine progression of care Report consisted of patients Situation, Background, Assessment and  
Recommendations(SBAR). Information from the following report(s) SBAR, ED Summary, [facility] and Recent Results was reviewed with the receiving nurse. Lines:  
Peripheral IV 03/28/19 Right Antecubital (Active) Site Assessment Clean, dry, & intact 3/28/2019  9:56 AM  
Phlebitis Assessment 0 3/28/2019  9:56 AM  
Infiltration Assessment 0 3/28/2019  9:56 AM  
Dressing Status Clean, dry, & intact 3/28/2019  9:56 AM  
Dressing Type Tape;Transparent 3/28/2019  9:56 AM  
Hub Color/Line Status Pink;Flushed;Patent 3/28/2019  9:56 AM  
Action Taken Blood drawn 3/28/2019  9:56 AM  
  
 
Opportunity for questions and clarification was provided. Patient transported with: 
 Monitor Tech

## 2019-03-28 NOTE — PROGRESS NOTES
Speech Pathology bedside swallow evaluation/discharge Patient: Ludwin Dodge (28 y.o. male) Date: 3/28/2019 Primary Diagnosis: Slurred speech [R47.81] Precautions: aspiration ASSESSMENT : 
Based on the objective data described below, the patient presents with normal swallow. He does have some c/o globus sensation and choking at times. This may correlate with esophageal issues, as he is on pain meds that slow motility of esophagus. He reports some cervical and spinal disc issues-please refer to neurology's note. No dysarthria at this time. Admitted with (B) leg weakness, dysarthria dizziness. Kash Forte Skilled acute therapy provided by a speech-language pathologist is not indicated at this time. PLAN : 
Recommendations: 
Regular diet, thin liquids. Discharge Recommendations: None SUBJECTIVE:  
Patient stated Sometimes I feel it going down. . 
 
OBJECTIVE:  
 
Past Medical History:  
Diagnosis Date  Chronic pain   
 back pain  GERD (gastroesophageal reflux disease) Past Surgical History:  
Procedure Laterality Date  HX HERNIA REPAIR Left   
 inguinal hernia repair  HX HERNIA REPAIR    
 as an infant Prior Level of Function/Home Situation:  
 Diet prior to admission: regular, thins Current Diet:  NPO. He passed the STANd Cognitive and Communication Status: 
Neurologic State: Alert Orientation Level: Oriented X4 Cognition: Appropriate decision making Perception: Appears intact Safety/Judgement: Insight into deficits Oral Assessment: 
Oral Assessment Labial: No impairment Dentition: Natural 
Oral Hygiene: [facility] Lingual: No impairment Velum: No impairment P.O. Trials: 
Patient Position: upright in bed Vocal quality prior to P.O.: No impairment Consistency Presented: Thin liquid; Solid;Puree How Presented: Self-fed/presented;Straw;Successive swallows ORAL PHASE:  
Bolus Acceptance: No impairment Bolus Formation/Control: No impairment Propulsion: No impairment Oral Residue: None PHARYNGEAL PHASE:  
Initiation of Swallow: No impairment Laryngeal Elevation: Functional 
Aspiration Signs/Symptoms: None Pharyngeal Phase Characteristics: No impairment, issues, or problems NOMS:  
The NOMS functional outcome measure was used to quantify this patient's level of swallowing impairment. Based on the NOMS, the patient was determined to be at level 7 for swallow function NOMS Swallowing Levels: 
Level 1 (CN): NPO Level 2 (CM): NPO but takes consistency in therapy Level 3 (CL): Takes less than 50% of nutrition p.o. and continues with nonoral feedings; and/or safe with mod cues; and/or max diet restriction Level 4 (CK): Safe swallow but needs mod cues; and/or mod diet restriction; and/or still requires some nonoral feeding/supplements Level 5 (CJ): Safe swallow with min diet restriction; and/or needs min cues Level 6 (CI): Independent with p.o.; rare cues; usually self cues; may need to avoid some foods or needs extra time Level 7 (CH): Independent for all p.o. KIYA. (2003). National Outcomes Measurement System (NOMS): Adult Speech-Language Pathology User's Guide. Pain: 
Pain Scale 1: Numeric (0 - 10) Pain Intensity 1: 0 After treatment:  
[] Patient left in no apparent distress sitting up in chair 
[] Patient left in no apparent distress in bed 
[] Call bell left within reach [x] Nursing notified 
[] Caregiver present 
[] Bed alarm activated COMMUNICATION/EDUCATION:  
The patients plan of care including findings, recommendations, and recommended diet changes were discussed with: Physical Therapist and NEURO NP. Patient was educated regarding His deficit(s) of TRANSIENT dysarthria  as this relates to His diagnosis of TIA. He demonstrated Good understanding as evidenced by discussion. Lambert Redmond [] Posted safety precautions in patient's room.  
[x] Patient/family have participated as able and agree with findings and recommendations. [] Patient is unable to participate in plan of care at this time. Thank you for this referral. 
Stefanie Basilio, SLP Time Calculation: 10 mins

## 2019-03-28 NOTE — H&P
[facility] Hospitalist Admission Note NAME:  Panda Carey :   1980 MRN:  [id number] PCP:  Nevaeh Prado DO Date/Time:  3/28/2019 11:05 AM 
 
  
  
Subjective: CHIEF COMPLAINT: slurred speech HISTORY OF PRESENT ILLNESS:    
Mr. Rima Rocha is a 35 y.o.  male who presented to the Emergency Department complaining of slurred speech and bilateral weakness in his hamstrings. All noted this AM after his hot shower. Improving. He has had a few visits to ER over years for non specific neurological and panic symptoms. He has chronic back pain, and has been taking more of his 7.5 percocet lately, including this AM before the shower. He was recently started on SOMA, which he took at midnight. He takes xanax for anxiety. He did not eat or drink this AM prior to the shower. ER workup so far is normal.  We will admit him for observation. Past Medical History:  
Diagnosis Date  Chronic pain   
 back pain  GERD (gastroesophageal reflux disease) Past Surgical History:  
Procedure Laterality Date  HX HERNIA REPAIR Left   
 inguinal hernia repair  HX HERNIA REPAIR    
 as an infant Social History Tobacco Use  Smoking status: Former Smoker Years: 15.00  Smokeless tobacco: Never Used  Tobacco comment: 1 pack/week Substance Use Topics  Alcohol use: Yes Alcohol/week: 0.0 oz Types: 4 - 5 Shots of liquor per week Family History Problem Relation Age of Onset  No Known Problems Mother  No Known Problems Father Allergies Allergen Reactions  Benadryl [Diphenhydramine Hcl] Other (comments) \"sleepy\" Made \"skin crawl\" Prior to Admission medications Medication Sig Start Date End Date Taking? Authorizing Provider  
oxyCODONE-acetaminophen (PERCOCET 7.5) 7.5-325 mg per tablet Take 1 Tab by mouth every four to six (4-6) hours as needed for Pain.    Yes Provider, Historical  
 carisoprodol (SOMA) 250 mg tablet Take 250 mg by mouth four (4) times daily as needed for Muscle Spasm(s). Yes Provider, Historical  
ALPRAZolam (XANAX) 1 mg tablet Take 0.5 mg by mouth daily as needed for Anxiety. Yes Provider, Historical  
therapeutic multivitamin (THERAGRAN) tablet Take 1 Tab by mouth daily. Yes Provider, Historical  
 
 
Review of Systems: 
(bold if positive, if negative) Gen:  Eyes:  ENT:  CVS:  Pulm:  GI:   
:   
MS:  Skin:  Psych:  , depression, anxiety,Endo:   
Hem:  Renal:   
Neuro:  weakness Objective: VITALS:   
Vital signs reviewed; most recent are: 
 
Visit Vitals BP (!) 153/94 Pulse 83 Temp 99 °F (37.2 °C) Resp 9 Wt 78.3 kg (172 lb 9.9 oz) SpO2 97% BMI 27.04 kg/m² SpO2 Readings from Last 6 Encounters:  
03/28/19 97% 11/07/18 97% 12/11/15 100% 12/10/14 97% 11/10/14 95% No intake or output data in the 24 hours ending 03/28/19 1225 Exam:  
 
Physical Exam: 
 
Gen:  Well-developed, well-nourished, in no acute distress HEENT:  Pink conjunctivae, PERRL, hearing intact to voice, moist mucous membranes Neck:  Supple, without masses, thyroid non-tender Resp:  No accessory muscle use, clear breath sounds without wheezes rales or rhonchi 
Card:  No murmurs, normal S1, S2 without thrills, bruits or peripheral edema Abd:  Soft, non-tender, non-distended, normoactive bowel sounds are present, no mass Lymph:  No cervical or inguinal adenopathy Musc:  No cyanosis or clubbing Skin:  No rashes or ulcers, skin turgor is good Neuro:  Cranial nerves are grossly intact, no focal motor weakness, follows commands appropriately Psych:  Good insight, oriented to person, place and time, alert Labs: 
 
Recent Labs  
  03/28/19 
3187 WBC 5.5 HGB 14.4 HCT 42.9  Recent Labs  
  03/28/19 
3303   
K 3.4*  
 CO2 27  BUN 14  
CREA 0.90  
CA 8.7 ALB 4.3 TBILI 0.3 SGOT 14* ALT 32 Lab Results Component Value Date/Time Glucose (POC) 107 (H) 03/28/2019 09:35 AM  
 Glucose (POC) 117 (H) 11/07/2018 10:02 AM  
 
No results for input(s): PH, PCO2, PO2, HCO3, FIO2 in the last 72 hours. No results for input(s): INR in the last 72 hours. No lab exists for component: INREXT, INREXT All Micro Results Procedure Component Value Units Date/Time CULTURE, URINE [[id number]] Collected:  03/28/19 1128 Order Status:  Completed Specimen:  Urine from Clean catch Updated:  03/28/19 114 I have reviewed previous records Assessment and Plan:  
  
Slurred speech - POA, resolving. Unclear etiology, but I think he was hypotensive due to vasodilation during the hot shower, exacerbated by percocet, xanax, SOMA and not eating. Unlikkely TIA. Check MRI, ECHO and carotids. Neuro consult. Check alcohol, drug, TSH, lipids, A1c, B12.  Speech consult. ASA. Chronic pain / back pain - Usually on percocet. Hold that while undergoing TIA workup. GERD (gastroesophageal reflux disease) - Continue pepcid Elevated blood pressure reading - Noted on presentation but resolved without treatment. Likely stress. Hypokalemia - POA, replete. Telemetry reviewed:   normal sinus rhythm Risk of deterioration: high Total time spent with patient: 79 Minutes Signed out to Dr Ximena King at [facility] discussed with: Patient, Nursing Staff and >50% of time spent in counseling and coordination of care Discussed:  Care Plan      
___________________________________________________ Attending Physician: Tricia Juarez MD

## 2019-03-28 NOTE — PROGRESS NOTES
Spiritual Care Assessment/Progress Note [address] 
 
 
NAME: Byron Barber      MRN: [id number] AGE: 35 y.o. SEX: male Yazdanism Affiliation: [place] Language: English  
 
3/28/2019     Total Time (in minutes): 10 Spiritual Assessment begun in [facility] EMERGENCY DEPT through conversation with: 
  
    [x]Patient        [] Family    [] Friend(s) Reason for Consult: Other (comment)(Code Stroke) Spiritual beliefs: (Please include comment if needed) 
   [] Identifies with a braeden tradition:     
   [] Supported by a bra[place] community:        
   [] Claims no spiritual orientation:       
   [] Seeking spiritual identity:            
   [] Adheres to an individual form of spirituality:       
   [x] Not able to assess:                   
 
    
Identified resources for coping:  
   [] Prayer                           
   [] Music                  [] Guided Imagery 
   [] Family/friends                 [] Pet visits [] Devotional reading                         [x] Unknown 
   [] Other:                                          
 
 
Interventions offered during this visit: (See comments for more details) Patient Interventions: Coping skills reviewed/reinforced Plan of Care: 
 
 [] Support spiritual and/or cultural needs  
 [] Support AMD and/or advance care planning process    
 [] Support grieving process 
 [] Coordinate Rites and/or Rituals  
 [] Coordination with community clergy [] No spiritual needs identified at this time 
 [] Detailed Plan of Care below (See Comments)  [] Make referral to Music Therapy 
[] Make referral to Pet Therapy    
[] Make referral to Addiction services 
[] Make referral to [facility] 
[] Make referral to Spiritual Care Partner 
[] No future visits requested       
[x] Follow up visits as needed Comments:  responded to Code Stroke Emergency Room (ER) room 8. Staff with patient providing care. He appears comfortable and without pain, good eye contact, communicating with staff, seated in wheelchair. Taken for CT scan. No family present. Will continue to follow up as needed and upon request as able. Visited by Rev. Khadijah Childress, [address] [facility]  paging service: [phone])

## 2019-03-28 NOTE — PROGRESS NOTES
Bedside shift change report given to Luis Alfredo Moses RN (oncoming nurse) by Sherrill Vu RN (offgoing nurse). Report included the following information SBAR, Kardex, MAR, Accordion, Recent Results and Med Rec Status.

## 2019-03-29 VITALS
TEMPERATURE: 98.4 F | BODY MASS INDEX: 27 KG/M2 | SYSTOLIC BLOOD PRESSURE: 132 MMHG | RESPIRATION RATE: 17 BRPM | DIASTOLIC BLOOD PRESSURE: 81 MMHG | WEIGHT: 172 LBS | OXYGEN SATURATION: 97 % | HEIGHT: 67 IN | HEART RATE: 86 BPM

## 2019-03-29 LAB
ATRIAL RATE: 88 BPM
CALCULATED P AXIS, ECG09: 49 DEGREES
CALCULATED R AXIS, ECG10: 14 DEGREES
CALCULATED T AXIS, ECG11: 29 DEGREES
DIAGNOSIS, 93000: NORMAL
P-R INTERVAL, ECG05: 142 MS
Q-T INTERVAL, ECG07: 330 MS
QRS DURATION, ECG06: 78 MS
QTC CALCULATION (BEZET), ECG08: 399 MS
VENTRICULAR RATE, ECG03: 88 BPM

## 2019-03-29 PROCEDURE — 74011250637 HC RX REV CODE- 250/637: Performed by: INTERNAL MEDICINE

## 2019-03-29 PROCEDURE — 74011250636 HC RX REV CODE- 250/636: Performed by: INTERNAL MEDICINE

## 2019-03-29 PROCEDURE — 74011250637 HC RX REV CODE- 250/637: Performed by: NURSE PRACTITIONER

## 2019-03-29 PROCEDURE — 99218 HC RM OBSERVATION: CPT

## 2019-03-29 RX ORDER — ATORVASTATIN CALCIUM 40 MG/1
40 TABLET, FILM COATED ORAL
Qty: 30 TAB | Refills: 0 | Status: SHIPPED | OUTPATIENT
Start: 2019-03-29 | End: 2020-03-06

## 2019-03-29 RX ADMIN — FAMOTIDINE 20 MG: 20 TABLET ORAL at 08:12

## 2019-03-29 RX ADMIN — SODIUM CHLORIDE AND POTASSIUM CHLORIDE: 9; 2.98 INJECTION, SOLUTION INTRAVENOUS at 08:39

## 2019-03-29 RX ADMIN — ASPIRIN 81 MG 81 MG: 81 TABLET ORAL at 08:11

## 2019-03-29 NOTE — PROGRESS NOTES
I have reviewed discharge instructions with the patient. The patient verbalized understanding. Discharge medications reviewed with patient and appropriate educational materials and side effects teaching were provided. Signed copy of discharge instructions placed in patient's chart. Patient decline wheelchair.

## 2019-03-29 NOTE — PROGRESS NOTES
Daily Progress Note: 3/29/2019 Lakesha Chung Bodily, DO Assessment/Plan:  
Slurred speech - POA, resolving. Unclear etiology, likely he was hypotensive due to vasodilation during the hot shower, exacerbated by percocet, xanax, SOMA and not eating. Unlikkely TIA. Check MRI, ECHO and carotids. Neuro consulted. Check alcohol, drug, TSH, lipids, A1c, B12.  Speech consult. ASA. 
  
Chronic pain / back pain - Usually on percocet. Hold that while undergoing TIA workup. 
  
GERD (gastroesophageal reflux disease) - Continue pepcid 
  
 Elevated blood pressure reading - Noted on presentation but resolved without treatment. Likely stress. 
  
 Hypokalemia - POA, replete. 
   
 
Problem List: 
Problem List as of 3/29/2019 Date Reviewed: 3/28/2019 Codes Class Noted - Resolved Chronic pain ICD-10-CM: G89.29 ICD-9-CM: 338.29  Unknown - Present Overview Signed 3/28/2019 11:00 AM by Randa Demarco MD  
  back pain GERD (gastroesophageal reflux disease) ICD-10-CM: K21.9 ICD-9-CM: 530.81  Unknown - Present Slurred speech ICD-10-CM: R47.81 ICD-9-CM: 784.59  3/28/2019 - Present Elevated blood pressure reading ICD-10-CM: R03.0 ICD-9-CM: 796.2  3/28/2019 - Present Subjective:  
 35 y.o.  male who presented to the Emergency Department complaining of slurred speech and bilateral weakness in his hamstrings. All noted this AM after his hot shower. Improving. He has had a few visits to ER over years for non specific neurological and panic symptoms. He has chronic back pain, and has been taking more of his 7.5 percocet lately, including this AM before the shower. He was recently started on SOMA, which he took at midnight. He takes xanax for anxiety. He did not eat or drink this AM prior to the shower. ER workup so far is normal.  We will admit him for observation. (Dr Carlos Lew) 3/29:  Reports sx have completely resolved and feels back to normal.  CT of head OK. MRI head and of spine pending. Neuro has seen. Discussed weaning dose of Percocet and not taking Soma and Xanax if he has taken Percocet - he will discuss further with Dr Fransico Rubin.   
  
12p- cleared by Neuro- will d/c today Review of Systems: A comprehensive review of systems was negative except for that written in the HPI. Objective:  
Physical Exam:  
 
Visit Vitals /81 (BP 1 Location: Left arm, BP Patient Position: At rest) Pulse 72 Temp 97.5 °F (36.4 °C) Resp 16 Ht 5' 7\" (1.702 m) Wt 78 kg (172 lb) SpO2 97% BMI 26.94 kg/m² O2 Device: Room air Temp (24hrs), Av.5 °F (36.9 °C), Min:97.5 °F (36.4 °C), Max:99.6 °F (37.6 °C) No intake/output data recorded. No intake/output data recorded. General:  Alert, cooperative, no distress, appears stated age. Head:  Normocephalic, without obvious abnormality, atraumatic. Eyes:  Conjunctivae/corneas clear. PERRL, EOMs intact. Nose: Nares normal. Septum midline. Mucosa normal. No drainage or sinus tenderness. Throat: Lips, mucosa, and tongue moist.. Neck: Supple, symmetrical, trachea midline, no adenopathy, thyroid: no enlargement/tenderness/nodules, no carotid bruit and no JVD. Back:   Symmetric, no curvature. ROM normal. No CVA tenderness. Lungs:   Clear to auscultation bilaterally. Chest wall:  No tenderness or deformity. Heart:  Regular rate and rhythm, S1, S2 normal, no murmur, click, rub or gallop. Abdomen:   Soft, non-tender. Bowel sounds normal. No masses,  No organomegaly. Extremities: no cyanosis or edema. No calf tenderness or cords. Pulses: 2+ and symmetric all extremities. Skin: Skin color, texture, turgor normal. No rashes or lesions Neurologic: CNII-XII intact. Alert and oriented X 3. Fine motor of hands and fingers normal.   equal.  No cogwheeling or rigidity.   Gait not tested at this time. Sensation grossly normal to touch. Gross motor of extremities normal.    
 
Data Review:  
  ECHO 3/28/19 · Estimated left ventricular ejection fraction is 56 - 60%. No regional wall motion abnormality noted. · No obvious right to left shunting with agitated saline CT Head 3/28/19 IMPRESSION: No acute intracranial process identified Recent Days: 
Recent Labs  
  03/28/19 
0956 WBC 5.5 HGB 14.4 HCT 42.9  Recent Labs  
  03/28/19 
3524   
K 3.4*  
 CO2 27  BUN 14  
CREA 0.90  
CA 8.7 ALB 4.3 TBILI 0.3 SGOT 14* ALT 32 No results for input(s): PH, PCO2, PO2, HCO3, FIO2 in the last 72 hours. 24 Hour Results: 
Recent Results (from the past 24 hour(s)) GLUCOSE, POC Collection Time: 03/28/19  9:35 AM  
Result Value Ref Range Glucose (POC) 107 (H) 65 - 100 mg/dL Performed by Former[facility] named [facility] [place] Collection Time: 03/28/19  9:56 AM  
Result Value Ref Range SAMPLES BEING HELD 1RED,1BL,1SST,1GRN   
 COMMENT Add-on orders for these samples will be processed based on acceptable specimen integrity and analyte stability, which may vary by analyte. CBC WITH AUTOMATED DIFF Collection Time: 03/28/19  9:56 AM  
Result Value Ref Range WBC 5.5 4.1 - 11.1 K/uL  
 RBC 4.77 4.10 - 5.70 M/uL  
 HGB 14.4 12.1 - 17.0 g/dL HCT 42.9 36.6 - 50.3 % MCV 89.9 80.0 - 99.0 FL  
 MCH 30.2 26.0 - 34.0 PG  
 MCHC 33.6 30.0 - 36.5 g/dL  
 RDW 12.6 11.5 - 14.5 % PLATELET 843 569 - 685 K/uL MPV 11.4 8.9 - 12.9 FL  
 NRBC 0.0 0  WBC ABSOLUTE NRBC 0.00 0.00 - 0.01 K/uL NEUTROPHILS 45 32 - 75 % LYMPHOCYTES 42 12 - 49 % MONOCYTES 6 5 - 13 % EOSINOPHILS 5 0 - 7 % BASOPHILS 1 0 - 1 % IMMATURE GRANULOCYTES 1 (H) 0.0 - 0.5 % ABS. NEUTROPHILS 2.5 1.8 - 8.0 K/UL  
 ABS. LYMPHOCYTES 2.3 0.8 - 3.5 K/UL  
 ABS. MONOCYTES 0.3 0.0 - 1.0 K/UL  
 ABS. EOSINOPHILS 0.3 0.0 - 0.4 K/UL ABS. BASOPHILS 0.1 0.0 - 0.1 K/UL  
 ABS. IMM. GRANS. 0.0 0.00 - 0.04 K/UL  
 DF AUTOMATED METABOLIC PANEL, COMPREHENSIVE Collection Time: 03/28/19  9:56 AM  
Result Value Ref Range Sodium 140 136 - 145 mmol/L Potassium 3.4 (L) 3.5 - 5.1 mmol/L Chloride 106 97 - 108 mmol/L  
 CO2 27 21 - 32 mmol/L Anion gap 7 5 - 15 mmol/L Glucose 100 65 - 100 mg/dL BUN 14 6 - 20 MG/DL Creatinine 0.90 0.70 - 1.30 MG/DL  
 BUN/Creatinine ratio 16 12 - 20 GFR est AA >60 >60 ml/min/1.73m2 GFR est non-AA >60 >60 ml/min/1.73m2 Calcium 8.7 8.5 - 10.1 MG/DL Bilirubin, total 0.3 0.2 - 1.0 MG/DL  
 ALT (SGPT) 32 12 - 78 U/L  
 AST (SGOT) 14 (L) 15 - 37 U/L Alk. phosphatase 42 (L) 45 - 117 U/L Protein, total 7.0 6.4 - 8.2 g/dL Albumin 4.3 3.5 - 5.0 g/dL Globulin 2.7 2.0 - 4.0 g/dL A-G Ratio 1.6 1.1 - 2.2 URINALYSIS W/MICROSCOPIC Collection Time: 03/28/19 11:28 AM  
Result Value Ref Range Color YELLOW/STRAW Appearance HAZY (A) CLEAR Specific gravity 1.030 1.003 - 1.030    
 pH (UA) 6.0 5.0 - 8.0 Protein TRACE (A) NEG mg/dL Glucose NEGATIVE  NEG mg/dL Ketone NEGATIVE  NEG mg/dL Bilirubin NEGATIVE  NEG Blood NEGATIVE  NEG Urobilinogen 0.2 0.2 - 1.0 EU/dL Nitrites NEGATIVE  NEG Leukocyte Esterase NEGATIVE  NEG    
 WBC 0-4 0 - 4 /hpf  
 RBC 0-5 0 - 5 /hpf Epithelial cells FEW FEW /lpf Bacteria NEGATIVE  NEG /hpf Hyaline cast 2-5 0 - 5 /lpf DRUG SCREEN, URINE Collection Time: 03/28/19 11:28 AM  
Result Value Ref Range AMPHETAMINES NEGATIVE  NEG    
 BARBITURATES NEGATIVE  NEG BENZODIAZEPINES NEGATIVE  NEG    
 COCAINE NEGATIVE  NEG METHADONE NEGATIVE  NEG    
 OPIATES POSITIVE (A) NEG    
 PCP(PHENCYCLIDINE) NEGATIVE  NEG    
 THC (TH-CANNABINOL) NEGATIVE  NEG Drug screen comment (NOTE) VITAMIN B12 Collection Time: 03/28/19 11:28 AM  
Result Value Ref Range Vitamin B12 632 193 - 986 pg/mL TSH 3RD GENERATION Collection Time: 03/28/19 11:28 AM  
Result Value Ref Range TSH 0.60 0.36 - 3.74 uIU/mL  
CRP, HIGH SENSITIVITY Collection Time: 03/28/19 11:28 AM  
Result Value Ref Range CRP, High sensitivity 0.6 mg/L  
ETHYL ALCOHOL Collection Time: 03/28/19 11:28 AM  
Result Value Ref Range ALCOHOL(ETHYL),SERUM <10 <10 MG/DL  
LIPID PANEL Collection Time: 03/28/19 11:28 AM  
Result Value Ref Range LIPID PROFILE Cholesterol, total 195 <200 MG/DL Triglyceride 218 (H) <150 MG/DL  
 HDL Cholesterol 45 MG/DL  
 LDL, calculated 106.4 (H) 0 - 100 MG/DL VLDL, calculated 43.6 MG/DL  
 CHOL/HDL Ratio 4.3 0.0 - 5.0 HEMOGLOBIN A1C WITH EAG Collection Time: 03/28/19 11:28 AM  
Result Value Ref Range Hemoglobin A1c 5.6 4.2 - 6.3 % Est. average glucose 114 mg/dL ECHO ADULT COMPLETE Collection Time: 03/28/19  2:25 PM  
Result Value Ref Range Aortic Valve Systolic Peak Velocity 965.24 cm/s Aortic Valve Area by Continuity of Peak Velocity 2.3 cm2 AoV PG 4.3 mmHg LVIDd 4.75 4.2 - 5.9 cm  
 LVPWd 0.72 0.6 - 1.0 cm LVIDs 3.40 cm IVSd 0.83 0.6 - 1.0 cm  
 LVOT d 1.96 cm  
 LVOT Peak Velocity 78.95 cm/s LVOT Peak Gradient 2.5 mmHg MV A Kameron 50.80 cm/s  
 MV E Kameron 80.24 cm/s  
 MV E/A 1.58 Left Atrium to Aortic Root Ratio 0.99   
 RVIDd 3.49 cm LA Vol 4C 42.86 18 - 58 mL  
 LA Vol 2C 46.82 18 - 58 mL  
 LV Mass .4 88 - 224 g LV Mass AL Index 70.9 49 - 115 g/m2 RVSP 26.5 mmHg Est. RA Pressure 3.0 mmHg Mitral Regurgitant Peak Velocity 298.94 cm/s Mitral Valve E Wave Deceleration Time 198.1 ms Left Atrium Major Axis 3.30 cm Triscuspid Valve Regurgitation Peak Gradient 23.5 mmHg  
 TR Max Velocity 242.44 cm/s PASP 26.5 mmHg LA Vol Index 24.69 16 - 28 ml/m2 LA Vol Index 22.60 16 - 28 ml/m2 MR Peak Gradient 35.7 mmHg LA Volume 48.20 18 - 58 mL Ao Root D 3.34 cm  
 LA Vol Index 25.41 16 - 28 ml/m2 Medications reviewed Current Facility-Administered Medications Medication Dose Route Frequency  famotidine (PEPCID) tablet 20 mg  20 mg Oral BID  sodium chloride (NS) flush 5-40 mL  5-40 mL IntraVENous Q8H  
 sodium chloride (NS) flush 5-40 mL  5-40 mL IntraVENous PRN  
 acetaminophen (TYLENOL) tablet 650 mg  650 mg Oral Q4H PRN  
 ondansetron (ZOFRAN) injection 4 mg  4 mg IntraVENous Q4H PRN  
 enoxaparin (LOVENOX) injection 40 mg  40 mg SubCUTAneous Q24H  
 0.9% sodium chloride with KCl 40 mEq/L infusion   IntraVENous CONTINUOUS  
 aspirin chewable tablet 81 mg  81 mg Oral DAILY  atorvastatin (LIPITOR) tablet 40 mg  40 mg Oral QHS Care Plan discussed with: Patient/Family and Nurse Total time spent with patient: 30 minutes.  
 
Braxton Valdez MD

## 2019-03-29 NOTE — DISCHARGE SUMMARY
Physician Discharge Summary     Patient ID:    Jorge Jaime  [id number]  54 y.o.  1980  Kun Lindsay DO    Admit date: 3/28/2019    Discharge date and time: 3/29/2019    Admission Diagnoses: Slurred speech [R47.81]    Chronic Diagnoses:    Problem List as of 3/29/2019 Date Reviewed: 3/28/2019          Codes Class Noted - Resolved    Chronic pain ICD-10-CM: G89.29  ICD-9-CM: 338.29  Unknown - Present    Overview Signed 3/28/2019 11:00 AM by Kendrick Mcfarland MD     back pain             GERD (gastroesophageal reflux disease) ICD-10-CM: K21.9  ICD-9-CM: 530.81  Unknown - Present        Slurred speech ICD-10-CM: R47.81  ICD-9-CM: 784.59  3/28/2019 - Present        Elevated blood pressure reading ICD-10-CM: R03.0  ICD-9-CM: 796.2  3/28/2019 - Present              Discharge Medications:   Current Discharge Medication List      START taking these medications    Details   atorvastatin (LIPITOR) 40 mg tablet Take 1 Tab by mouth nightly. Qty: 30 Tab, Refills: 0         CONTINUE these medications which have NOT CHANGED    Details   oxyCODONE-acetaminophen (PERCOCET 7.5) 7.5-325 mg per tablet Take 1 Tab by mouth every four to six (4-6) hours as needed for Pain. carisoprodol (SOMA) 250 mg tablet Take 250 mg by mouth four (4) times daily as needed for Muscle Spasm(s). ALPRAZolam (XANAX) 1 mg tablet Take 0.5 mg by mouth daily as needed for Anxiety. therapeutic multivitamin (THERAGRAN) tablet Take 1 Tab by mouth daily. STOP taking these medications       famotidine (PEPCID) 20 mg tablet Comments:   Reason for Stopping: Follow up Care:    1. Kun Lindsay DO with in 1 weeks  2. Neuro specialists as directed. Diet:  Regular Diet and Low fat, Low cholesterol    Disposition:  Home.     Advanced Directive:    Discharge Exam:  [See today's progress note.]    CONSULTATIONS: Neurology    Significant Diagnostic Studies:   Recent Labs     03/28/19  0956   WBC 5.5   HGB 14.4 HCT 42.9        Recent Labs     03/28/19  0956      K 3.4*      CO2 27   BUN 14   CREA 0.90      CA 8.7     Recent Labs     03/28/19  0956   SGOT 14*   ALT 32   AP 42*   TBILI 0.3   TP 7.0   ALB 4.3   GLOB 2.7       Lab Results   Component Value Date/Time    Glucose (POC) 107 (H) 03/28/2019 09:35 AM    Glucose (POC) 117 (H) 11/07/2018 10:02 AM     Lab Results   Component Value Date/Time    TSH 0.60 03/28/2019 11:28 AM       IMPRESSION: Normal MRI of the head    IMPRESSION:MRI LS Spine   1. Minimal 3 mm anterior subluxation at the L5-S1 level. There appears to be a  right-sided L5 pars defect. Left-sided pars defect cannot be confirmed. Radiographs with oblique images would be of benefit.      2. Degenerative disc disease at the L4-L5 and L5-S1 levels. Mild neuroforaminal  narrowing is also noted at both these levels. HOSPITAL COURSE & TREATMENT RENDERED:   Assessment/Plan:   Slurred speech - POA, resolving. Unclear etiology, likely he was hypotensive due to vasodilation during the hot shower, exacerbated by percocet, xanax, SOMA and not eating.  Unlikkely TIA. Check MRI, ECHO and carotids.  Neuro consulted.  Check alcohol, drug, TSH, lipids, A1c, B12.  Speech consult.  ASA.     Chronic pain / back pain - Usually on percocet.  Hold that while undergoing TIA workup.     GERD (gastroesophageal reflux disease) - Continue pepcid      Elevated blood pressure reading - Noted on presentation but resolved without treatment.  Likely stress.      Hypokalemia - POA, replete.           Subjective:    39 y.o.   male who presented to the Emergency Department complaining of slurred speech and bilateral weakness in his hamstrings.  All noted this AM after his hot shower. Vincenzo Calvin had a few visits to ER over years for non specific neurological and panic symptoms.  He has chronic back pain, and has been taking more of his 7.5 percocet lately, including this AM before the shower. Juliet Casillas was recently started on SOMA, which he took at midnight. He takes xanax for anxiety. He did not eat or drink this AM prior to the shower.  ER workup so far is normal.  We will admit him for observation. (Dr Shaunna Robins)     3/29:  Reports sx have completely resolved and feels back to normal.  CT of head OK. MRI head and of spine pending. Neuro has seen. Discussed weaning dose of Percocet and not taking Soma and Xanax if he has taken Percocet - he will discuss further with Dr Codi Orozco.       12p- cleared by Neuro- will d/c today  Review of Systems:   A comprehensive review of systems was negative except for that written in the HPI.     Objective:   Physical Exam:      Visit Vitals  /81 (BP 1 Location: Left arm, BP Patient Position: At rest)   Pulse 72   Temp 97.5 °F (36.4 °C)   Resp 16   Ht 5' 7\" (1.702 m)   Wt 78 kg (172 lb)   SpO2 97%   BMI 26.94 kg/m²      O2 Device: Room air     Temp (24hrs), Av.5 °F (36.9 °C), Min:97.5 °F (36.4 °C), Max:99.6 °F (37.6 °C)    No intake/output data recorded. No intake/output data recorded. General:  Alert, cooperative, no distress, appears stated age. Head:  Normocephalic, without obvious abnormality, atraumatic. Eyes:  Conjunctivae/corneas clear. PERRL, EOMs intact. Nose: Nares normal. Septum midline. Mucosa normal. No drainage or sinus tenderness. Throat: Lips, mucosa, and tongue moist..   Neck: Supple, symmetrical, trachea midline, no adenopathy, thyroid: no enlargement/tenderness/nodules, no carotid bruit and no JVD. Back:   Symmetric, no curvature. ROM normal. No CVA tenderness. Lungs:   Clear to auscultation bilaterally. Chest wall:  No tenderness or deformity. Heart:  Regular rate and rhythm, S1, S2 normal, no murmur, click, rub or gallop. Abdomen:   Soft, non-tender. Bowel sounds normal. No masses,  No organomegaly. Extremities: no cyanosis or edema. No calf tenderness or cords. Pulses: 2+ and symmetric all extremities.    Skin: Skin color, texture, turgor normal. No rashes or lesions   Neurologic: CNII-XII intact. Alert and oriented X 3. Fine motor of hands and fingers normal.   equal.  No cogwheeling or rigidity. Gait not tested at this time. Sensation grossly normal to touch. Gross motor of extremities normal.        Data Review:     ECHO 3/28/19  · Estimated left ventricular ejection fraction is 56 - 60%. No regional wall motion abnormality noted.   · No obvious right to left shunting with agitated saline     CT Head 3/28/19  IMPRESSION: No acute intracranial process identified      Signed:  Porfirio Vaughn MD  3/29/2019  11:58 AM

## 2019-03-29 NOTE — PROGRESS NOTES
Bedside shift change report given to Adam (oncoming nurse) by Varghese Ireland (offgoing nurse). Report included the following information SBAR, Kardex, Intake/Output, MAR, Accordion, Recent Results, Med Rec Status and Cardiac Rhythm NSR.

## 2019-03-29 NOTE — DISCHARGE INSTRUCTIONS
Patient Discharge Instructions    Hector Gaxiola / [id number] : 1980    Admitted 3/28/2019 Discharged: 3/29/2019 11:58 AM     ACUTE DIAGNOSES:  Slurred speech [R47.81]    CHRONIC MEDICAL DIAGNOSES:  Problem List as of 3/29/2019 Date Reviewed: 3/28/2019          Codes Class Noted - Resolved    Chronic pain ICD-10-CM: G89.29  ICD-9-CM: 338.29  Unknown - Present    Overview Signed 3/28/2019 11:00 AM by Samson Armendariz MD     back pain             GERD (gastroesophageal reflux disease) ICD-10-CM: K21.9  ICD-9-CM: 530.81  Unknown - Present        Slurred speech ICD-10-CM: R47.81  ICD-9-CM: 784.59  3/28/2019 - Present        Elevated blood pressure reading ICD-10-CM: R03.0  ICD-9-CM: 796.2  3/28/2019 - Present              DISCHARGE MEDICATIONS:         · It is important that you take the medication exactly as they are prescribed. · Keep your medication in the bottles provided by the pharmacist and keep a list of the medication names, dosages, and times to be taken in your wallet. · Do not take other medications without consulting your doctor. DIET:  Regular Diet    ACTIVITY: Activity as tolerated    ADDITIONAL INFORMATION: If you experience any of the following symptoms then please call your primary care physician or return to the emergency room if you cannot get hold of your doctor: Fever, chills, nausea, vomiting, diarrhea, change in mentation, falling, bleeding, shortness of breath. FOLLOW UP CARE:  Dr. Eliot Denver, Syed Fu, DO  you are to call and set up an appointment to see them with in 1 week. Follow-up with specialists at directed by them      Information obtained by :  I understand that if any problems occur once I am at home I am to contact my physician. I understand and acknowledge receipt of the instructions indicated above.                                                                                                                                            Physician's or R.N.'s Signature                                                                  Date/Time                                                                                                                                              Patient or Representative Signature                                                          Date/Time

## 2019-03-29 NOTE — PROGRESS NOTES
[facility]: [facility] Neurology [facility] Delbert Sanders [phone] Name:   Dane Anne Medical record #: [id number] Admission Date: 3/28/2019 Who Consulted: Dr. Homa Fu Reason for Consult: TIA Subjective:  
Overnight events: 
 
Work up complete Objective: MRI:   
Assessment/Plan: 1. Bilateral lower ext weakness: · ASA 81 mg · Stroke is unlikely but will check MRI brain with spine · Neurochecks:  Every 4 hours 
  
R 
3. Mobility:  
· Has been OOB. · PT/OT to eval for rehab 
  
4. Diet:   
· Does not need SLP  
  
5. VTE Prophylaxes: · Per Primary team   
  Thank you for allowing the Neurology Service the pleasure of participating in the care of your patient. This patient will be discussed with my collaborating care team physician Dr. Lola Jacinto and she may have further recommendations regarding this patient's care. Will sign off at this time, follow up with Dr. Mina Willis.  
  
  
   
Attending Attestation:  
============================================================== 
  
Attending Addendum 
  
I have reviewed the documentation provided by the nurse practitioner, Marta Pichardo, discussed her findings, clinical impression, and the proposed management plans with regards to this patient's encounter. I have personally evaluated the patient, verify the history and confirm physical findings.  Below are my additional findings: 
  
Subjective: 
I feel better today. Pt with no new complaints. Discussed results.  
  
CLINICAL DATA REVIEW IMAGING: MRI brain negative (I personally reviewed these images in PACS and this is my impression) 
 MRI L spine with no acute process but a new disc at L5-S1  
  
PHYSICAL EXAM (additional findings) Patient Vitals for the past 8 hrs: 
  Temp Pulse Resp BP SpO2  
03/28/19 1500  81     
03/28/19 1427 98.5 °F (36.9 °C) 76 16 (!) 152/96 100 % 03/28/19 1322    135/86  03/28/19 1234 99.6 °F (37.6 °C) 79 14 135/86 100 % Pt seen and examined with NP Neil Massey and my exam is below. ADDITIONAL ASSESSMENT AND RECOMMENDATIONS: 
This is a 50-year-old gentleman who presented to the emergency room with complaints of transient difficulty with speech and bilateral lower extremity weakness/numbness. I suspect his leg issue could be secondary to his lumbar disc which was seen on his lumbar spine but we will w/u this further with his outpt neurologist. His speech difficulty could be TIA though he has no stroke risk factors. MRI brain neg for stroke. 
  
1. MRI brain neg 2. MRI lumbar spine with L5/S1 with disc which will be evaluated further by his outpt neuro. 3.  No need for EEG 4. Continue aspirin 81 mg daily started this admission 5. Agree with starting Lipitor due  6. PT/OT/ST complete 
 
 No further neuro f/u needed. Will sign off but please call with further questions. Oswaldo Francisco MD 
3/29/2019 
 
   
 
 
Physical Exam: 
General:  Alert, cooperative, no acute distress. Lungs:   Clear to auscultation bilaterally. No crackles/wheezes. Heart: 
Abdominal:  Regular rate and rhythm, No murmur, click, rub or gallop. Soft and nondistended Skin: Skin color, texture, turgor normal.   
NEUROLOGICAL EXAM: 
 
Appearance:  Well developed, well nourished,  and is in no acute distress. Mental Status: Oriented to time, place and person. Fully attentive. No aphasia. Full fund of knowledge. Normal recent and remote memory. Cranial Nerves:   Intact visual fields. PERRL, EOM's full, no nystagmus, no ptosis. Facial sensation is normal. Facial movement is symmetric. Palate is midline. Normal sternocleidomastoid strength. Tongue is midline. Motor:  5/5 strength in upper and lower proximal and distal muscles. Normal bulk and tone. Reflexes:   Deep tendon reflexes 2+/4 and symmetrical.  
Sensory:   Normal to temperature and vibration. Gait:  Normal gait. Tremor:   No tremor noted. Cerebellar:  No cerebellar signs present. Deltoid Biceps Triceps Wrist Extension Finger Abduction L 5 5 5 5 5  
R 5 5 5 5 5 Hip Flexion Hip Extension Knee Flexion Knee Extension Ankle Dorsiflexion Ankle Plantarflexion L 5 5 5 5 5 5  
R 5 5 5 5 5 5 Reflexes:   
 Biceps Triceps Plantar Patellar Achilles L 2 2 2 2 2 R 2 2 2 2 2 Current Facility-Administered Medications Medication Dose Route Frequency Provider Last Rate Last Dose  famotidine (PEPCID) tablet 20 mg  20 mg Oral BID Virginie Penn MD   20 mg at 03/29/19 3224  sodium chloride (NS) flush 5-40 mL  5-40 mL IntraVENous Q8H Virginie Penn MD   10 mL at 03/28/19 2233  sodium chloride (NS) flush 5-40 mL  5-40 mL IntraVENous PRN Virginie Penn MD      
 acetaminophen (TYLENOL) tablet 650 mg  650 mg Oral Q4H PRN Virginie Penn MD      
 ondansetron [facility]) injection 4 mg  4 mg IntraVENous Q4H PRN Virginie Penn MD      
 enoxaparin (LOVENOX) injection 40 mg  40 mg SubCUTAneous Q24H Virginie Penn MD   40 mg at 03/28/19 2100  
 0.9% sodium chloride with KCl 40 mEq/L infusion   IntraVENous CONTINUOUS Virginie Penn  mL/hr at 03/29/19 8242  aspirin chewable tablet 81 mg  81 mg Oral DAILY Corrie SCHULZ NP   81 mg at 03/29/19 0811  
 atorvastatin (LIPITOR) tablet 40 mg  40 mg Oral QHS Laura Garcia MD   40 mg at 03/28/19 2200 Recent Results (from the past 24 hour(s)) GLUCOSE, POC Collection Time: 03/28/19  9:35 AM  
Result Value Ref Range Glucose (POC) 107 (H) 65 - 100 mg/dL Performed by [address] [place] [address] Collection Time: 03/28/19  9:56 AM  
Result Value Ref Range SAMPLES BEING HELD 1RED,1BL,1SST,1GRN   
 COMMENT Add-on orders for these samples will be processed based on acceptable specimen integrity and analyte stability, which may vary by analyte.   
CBC WITH AUTOMATED DIFF  
 Collection Time: 03/28/19  9:56 AM  
Result Value Ref Range WBC 5.5 4.1 - 11.1 K/uL  
 RBC 4.77 4.10 - 5.70 M/uL  
 HGB 14.4 12.1 - 17.0 g/dL HCT 42.9 36.6 - 50.3 % MCV 89.9 80.0 - 99.0 FL  
 MCH 30.2 26.0 - 34.0 PG  
 MCHC 33.6 30.0 - 36.5 g/dL  
 RDW 12.6 11.5 - 14.5 % PLATELET 216 862 - 206 K/uL MPV 11.4 8.9 - 12.9 FL  
 NRBC 0.0 0  WBC ABSOLUTE NRBC 0.00 0.00 - 0.01 K/uL NEUTROPHILS 45 32 - 75 % LYMPHOCYTES 42 12 - 49 % MONOCYTES 6 5 - 13 % EOSINOPHILS 5 0 - 7 % BASOPHILS 1 0 - 1 % IMMATURE GRANULOCYTES 1 (H) 0.0 - 0.5 % ABS. NEUTROPHILS 2.5 1.8 - 8.0 K/UL  
 ABS. LYMPHOCYTES 2.3 0.8 - 3.5 K/UL  
 ABS. MONOCYTES 0.3 0.0 - 1.0 K/UL  
 ABS. EOSINOPHILS 0.3 0.0 - 0.4 K/UL  
 ABS. BASOPHILS 0.1 0.0 - 0.1 K/UL  
 ABS. IMM. GRANS. 0.0 0.00 - 0.04 K/UL  
 DF AUTOMATED METABOLIC PANEL, COMPREHENSIVE Collection Time: 03/28/19  9:56 AM  
Result Value Ref Range Sodium 140 136 - 145 mmol/L Potassium 3.4 (L) 3.5 - 5.1 mmol/L Chloride 106 97 - 108 mmol/L  
 CO2 27 21 - 32 mmol/L Anion gap 7 5 - 15 mmol/L Glucose 100 65 - 100 mg/dL BUN 14 6 - 20 MG/DL Creatinine 0.90 0.70 - 1.30 MG/DL  
 BUN/Creatinine ratio 16 12 - 20 GFR est AA >60 >60 ml/min/1.73m2 GFR est non-AA >60 >60 ml/min/1.73m2 Calcium 8.7 8.5 - 10.1 MG/DL Bilirubin, total 0.3 0.2 - 1.0 MG/DL  
 ALT (SGPT) 32 12 - 78 U/L  
 AST (SGOT) 14 (L) 15 - 37 U/L Alk. phosphatase 42 (L) 45 - 117 U/L Protein, total 7.0 6.4 - 8.2 g/dL Albumin 4.3 3.5 - 5.0 g/dL Globulin 2.7 2.0 - 4.0 g/dL A-G Ratio 1.6 1.1 - 2.2 URINALYSIS W/MICROSCOPIC Collection Time: 03/28/19 11:28 AM  
Result Value Ref Range Color YELLOW/STRAW Appearance HAZY (A) CLEAR Specific gravity 1.030 1.003 - 1.030    
 pH (UA) 6.0 5.0 - 8.0 Protein TRACE (A) NEG mg/dL Glucose NEGATIVE  NEG mg/dL Ketone NEGATIVE  NEG mg/dL Bilirubin NEGATIVE  NEG  Blood NEGATIVE  NEG    
 Urobilinogen 0.2 0.2 - 1.0 EU/dL Nitrites NEGATIVE  NEG Leukocyte Esterase NEGATIVE  NEG    
 WBC 0-4 0 - 4 /hpf  
 RBC 0-5 0 - 5 /hpf Epithelial cells FEW FEW /lpf Bacteria NEGATIVE  NEG /hpf Hyaline cast 2-5 0 - 5 /lpf DRUG SCREEN, URINE Collection Time: 03/28/19 11:28 AM  
Result Value Ref Range AMPHETAMINES NEGATIVE  NEG    
 BARBITURATES NEGATIVE  NEG BENZODIAZEPINES NEGATIVE  NEG    
 COCAINE NEGATIVE  NEG METHADONE NEGATIVE  NEG    
 OPIATES POSITIVE (A) NEG    
 PCP(PHENCYCLIDINE) NEGATIVE  NEG    
 THC (TH-CANNABINOL) NEGATIVE  NEG Drug screen comment (NOTE) VITAMIN B12 Collection Time: 03/28/19 11:28 AM  
Result Value Ref Range Vitamin B12 632 193 - 986 pg/mL TSH 3RD GENERATION Collection Time: 03/28/19 11:28 AM  
Result Value Ref Range TSH 0.60 0.36 - 3.74 uIU/mL  
CRP, HIGH SENSITIVITY Collection Time: 03/28/19 11:28 AM  
Result Value Ref Range CRP, High sensitivity 0.6 mg/L  
ETHYL ALCOHOL Collection Time: 03/28/19 11:28 AM  
Result Value Ref Range ALCOHOL(ETHYL),SERUM <10 <10 MG/DL  
LIPID PANEL Collection Time: 03/28/19 11:28 AM  
Result Value Ref Range LIPID PROFILE Cholesterol, total 195 <200 MG/DL Triglyceride 218 (H) <150 MG/DL  
 HDL Cholesterol 45 MG/DL  
 LDL, calculated 106.4 (H) 0 - 100 MG/DL VLDL, calculated 43.6 MG/DL  
 CHOL/HDL Ratio 4.3 0.0 - 5.0 HEMOGLOBIN A1C WITH EAG Collection Time: 03/28/19 11:28 AM  
Result Value Ref Range Hemoglobin A1c 5.6 4.2 - 6.3 % Est. average glucose 114 mg/dL ECHO ADULT COMPLETE Collection Time: 03/28/19  2:25 PM  
Result Value Ref Range Aortic Valve Systolic Peak Velocity 885.38 cm/s Aortic Valve Area by Continuity of Peak Velocity 2.3 cm2 AoV PG 4.3 mmHg LVIDd 4.75 4.2 - 5.9 cm  
 LVPWd 0.72 0.6 - 1.0 cm LVIDs 3.40 cm IVSd 0.83 0.6 - 1.0 cm  
 LVOT d 1.96 cm  
 LVOT Peak Velocity 78.95 cm/s LVOT Peak Gradient 2.5 mmHg MV A Kameron 50.80 cm/s  
 MV E Kameron 80.24 cm/s  
 MV E/A 1.58 Left Atrium to Aortic Root Ratio 0.99   
 RVIDd 3.49 cm LA Vol 4C 42.86 18 - 58 mL  
 LA Vol 2C 46.82 18 - 58 mL  
 LV Mass .4 88 - 224 g LV Mass AL Index 70.9 49 - 115 g/m2 RVSP 26.5 mmHg Est. RA Pressure 3.0 mmHg Mitral Regurgitant Peak Velocity 298.94 cm/s Mitral Valve E Wave Deceleration Time 198.1 ms Left Atrium Major Axis 3.30 cm Triscuspid Valve Regurgitation Peak Gradient 23.5 mmHg  
 TR Max Velocity 242.44 cm/s PASP 26.5 mmHg LA Vol Index 24.69 16 - 28 ml/m2 LA Vol Index 22.60 16 - 28 ml/m2 MR Peak Gradient 35.7 mmHg LA Volume 48.20 18 - 58 mL Ao Root D 3.34 cm  
 LA Vol Index 25.41 16 - 28 ml/m2 Visit Vitals /76 (BP 1 Location: Left arm, BP Patient Position: At rest) Pulse 96 Temp 97.9 °F (36.6 °C) Resp 18 Ht 5' 7\" (1.702 m) Wt 78 kg (172 lb) SpO2 98% BMI 26.94 kg/m² O2 Device: Room air Temp (24hrs), Av.4 °F (36.9 °C), Min:97.5 °F (36.4 °C), Max:99.6 °F (37.6 °C) No intake/output data recorded. No intake/output data recorded. Care Plan discussed with: 
Patient x Family RN Care Manager Consultant/Specialist:    
 
Magdalena Massey, DANICAP-BC

## 2019-03-30 LAB
BACTERIA SPEC CULT: NORMAL
CC UR VC: NORMAL
SERVICE CMNT-IMP: NORMAL

## 2019-04-23 ENCOUNTER — HOSPITAL ENCOUNTER (OUTPATIENT)
Dept: GENERAL RADIOLOGY | Age: 39
Discharge: HOME | End: 2019-04-23
Attending: PSYCHIATRY & NEUROLOGY
Payer: SUBSIDIZED

## 2019-04-23 DIAGNOSIS — M79.10 MYALGIA: ICD-10-CM

## 2019-04-23 PROCEDURE — 72100 X-RAY EXAM L-S SPINE 2/3 VWS: CPT

## 2019-04-23 PROCEDURE — 72050 X-RAY EXAM NECK SPINE 4/5VWS: CPT

## 2020-03-06 ENCOUNTER — HOSPITAL ENCOUNTER (EMERGENCY)
Age: 40
Discharge: HOME | End: 2020-03-06
Attending: EMERGENCY MEDICINE
Payer: MEDICAID

## 2020-03-06 ENCOUNTER — APPOINTMENT (OUTPATIENT)
Dept: MRI IMAGING | Age: 40
End: 2020-03-06
Attending: FAMILY MEDICINE
Payer: MEDICAID

## 2020-03-06 ENCOUNTER — HOSPITAL ENCOUNTER (OUTPATIENT)
Age: 40
Setting detail: OBSERVATION
Discharge: HOME | End: 2020-03-07
Attending: EMERGENCY MEDICINE | Admitting: FAMILY MEDICINE
Payer: MEDICAID

## 2020-03-06 ENCOUNTER — APPOINTMENT (OUTPATIENT)
Dept: CT IMAGING | Age: 40
End: 2020-03-06
Attending: STUDENT IN AN ORGANIZED HEALTH CARE EDUCATION/TRAINING PROGRAM
Payer: MEDICAID

## 2020-03-06 ENCOUNTER — APPOINTMENT (OUTPATIENT)
Dept: CT IMAGING | Age: 40
End: 2020-03-06
Attending: FAMILY MEDICINE
Payer: MEDICAID

## 2020-03-06 VITALS
BODY MASS INDEX: 27.47 KG/M2 | DIASTOLIC BLOOD PRESSURE: 98 MMHG | WEIGHT: 175 LBS | HEIGHT: 67 IN | HEART RATE: 80 BPM | TEMPERATURE: 98.9 F | RESPIRATION RATE: 20 BRPM | OXYGEN SATURATION: 96 % | SYSTOLIC BLOOD PRESSURE: 110 MMHG

## 2020-03-06 DIAGNOSIS — R29.818 TRANSIENT NEUROLOGICAL SYMPTOMS: ICD-10-CM

## 2020-03-06 DIAGNOSIS — R19.5 DARK STOOLS: Primary | ICD-10-CM

## 2020-03-06 DIAGNOSIS — G45.9 TIA (TRANSIENT ISCHEMIC ATTACK): Primary | ICD-10-CM

## 2020-03-06 DIAGNOSIS — R10.13 ABDOMINAL PAIN, EPIGASTRIC: ICD-10-CM

## 2020-03-06 LAB
ALBUMIN SERPL-MCNC: 4.1 G/DL (ref 3.5–5)
ALBUMIN SERPL-MCNC: 4.2 G/DL (ref 3.5–5)
ALBUMIN/GLOB SERPL: 1.3 {RATIO} (ref 1.1–2.2)
ALBUMIN/GLOB SERPL: 1.3 {RATIO} (ref 1.1–2.2)
ALP SERPL-CCNC: 63 U/L (ref 45–117)
ALP SERPL-CCNC: 65 U/L (ref 45–117)
ALT SERPL-CCNC: 96 U/L (ref 12–78)
ALT SERPL-CCNC: 97 U/L (ref 12–78)
AMPHET UR QL SCN: NEGATIVE
ANION GAP SERPL CALC-SCNC: 10 MMOL/L (ref 5–15)
ANION GAP SERPL CALC-SCNC: 8 MMOL/L (ref 5–15)
APAP SERPL-MCNC: 5 UG/ML (ref 10–30)
APPEARANCE UR: CLEAR
APTT PPP: 25.6 SEC (ref 22.1–32)
AST SERPL-CCNC: 74 U/L (ref 15–37)
AST SERPL-CCNC: 74 U/L (ref 15–37)
BACTERIA URNS QL MICRO: NEGATIVE /HPF
BARBITURATES UR QL SCN: NEGATIVE
BASOPHILS # BLD: 0.1 K/UL (ref 0–0.1)
BASOPHILS # BLD: 0.1 K/UL (ref 0–0.1)
BASOPHILS NFR BLD: 1 % (ref 0–1)
BASOPHILS NFR BLD: 1 % (ref 0–1)
BENZODIAZ UR QL: NEGATIVE
BILIRUB SERPL-MCNC: 0.4 MG/DL (ref 0.2–1)
BILIRUB SERPL-MCNC: 0.6 MG/DL (ref 0.2–1)
BILIRUB UR QL: NEGATIVE
BUN SERPL-MCNC: 16 MG/DL (ref 6–20)
BUN SERPL-MCNC: 22 MG/DL (ref 6–20)
BUN/CREAT SERPL: 15 (ref 12–20)
BUN/CREAT SERPL: 22 (ref 12–20)
CALCIUM SERPL-MCNC: 8.6 MG/DL (ref 8.5–10.1)
CALCIUM SERPL-MCNC: 8.9 MG/DL (ref 8.5–10.1)
CANNABINOIDS UR QL SCN: NEGATIVE
CHLORIDE SERPL-SCNC: 100 MMOL/L (ref 97–108)
CHLORIDE SERPL-SCNC: 103 MMOL/L (ref 97–108)
CO2 SERPL-SCNC: 23 MMOL/L (ref 21–32)
CO2 SERPL-SCNC: 24 MMOL/L (ref 21–32)
COCAINE UR QL SCN: NEGATIVE
COLOR UR: NORMAL
COMMENT, HOLDF: NORMAL
COMMENT, HOLDF: NORMAL
CREAT SERPL-MCNC: 1.02 MG/DL (ref 0.7–1.3)
CREAT SERPL-MCNC: 1.08 MG/DL (ref 0.7–1.3)
DIFFERENTIAL METHOD BLD: ABNORMAL
DIFFERENTIAL METHOD BLD: ABNORMAL
DRUG SCRN COMMENT,DRGCM: NORMAL
EOSINOPHIL # BLD: 0.1 K/UL (ref 0–0.4)
EOSINOPHIL # BLD: 0.2 K/UL (ref 0–0.4)
EOSINOPHIL NFR BLD: 2 % (ref 0–7)
EOSINOPHIL NFR BLD: 2 % (ref 0–7)
EPITH CASTS URNS QL MICRO: NORMAL /LPF
ERYTHROCYTE [DISTWIDTH] IN BLOOD BY AUTOMATED COUNT: 11.9 % (ref 11.5–14.5)
ERYTHROCYTE [DISTWIDTH] IN BLOOD BY AUTOMATED COUNT: 12 % (ref 11.5–14.5)
GLOBULIN SER CALC-MCNC: 3.1 G/DL (ref 2–4)
GLOBULIN SER CALC-MCNC: 3.3 G/DL (ref 2–4)
GLUCOSE BLD STRIP.AUTO-MCNC: 109 MG/DL (ref 65–100)
GLUCOSE SERPL-MCNC: 102 MG/DL (ref 65–100)
GLUCOSE SERPL-MCNC: 107 MG/DL (ref 65–100)
GLUCOSE UR STRIP.AUTO-MCNC: NEGATIVE MG/DL
HAV IGM SER QL: NONREACTIVE
HBV CORE IGM SER QL: NONREACTIVE
HBV SURFACE AG SER QL: 0.26 INDEX
HBV SURFACE AG SER QL: NEGATIVE
HCT VFR BLD AUTO: 41.1 % (ref 36.6–50.3)
HCT VFR BLD AUTO: 42.6 % (ref 36.6–50.3)
HCT VFR BLD AUTO: 44.2 % (ref 36.6–50.3)
HCV AB SERPL QL IA: NONREACTIVE
HCV COMMENT,HCGAC: NORMAL
HEMOCCULT STL QL: NEGATIVE
HGB BLD-MCNC: 14.1 G/DL (ref 12.1–17)
HGB BLD-MCNC: 14.6 G/DL (ref 12.1–17)
HGB BLD-MCNC: 14.8 G/DL (ref 12.1–17)
HGB UR QL STRIP: NEGATIVE
HYALINE CASTS URNS QL MICRO: NORMAL /LPF (ref 0–5)
IMM GRANULOCYTES # BLD AUTO: 0.1 K/UL (ref 0–0.04)
IMM GRANULOCYTES # BLD AUTO: 0.1 K/UL (ref 0–0.04)
IMM GRANULOCYTES NFR BLD AUTO: 1 % (ref 0–0.5)
IMM GRANULOCYTES NFR BLD AUTO: 1 % (ref 0–0.5)
INR PPP: 1 (ref 0.9–1.1)
KETONES UR QL STRIP.AUTO: NEGATIVE MG/DL
LEUKOCYTE ESTERASE UR QL STRIP.AUTO: NEGATIVE
LIPASE SERPL-CCNC: 311 U/L (ref 73–393)
LYMPHOCYTES # BLD: 1.9 K/UL (ref 0.8–3.5)
LYMPHOCYTES # BLD: 2.5 K/UL (ref 0.8–3.5)
LYMPHOCYTES NFR BLD: 27 % (ref 12–49)
LYMPHOCYTES NFR BLD: 28 % (ref 12–49)
MCH RBC QN AUTO: 30.1 PG (ref 26–34)
MCH RBC QN AUTO: 30.1 PG (ref 26–34)
MCHC RBC AUTO-ENTMCNC: 33.5 G/DL (ref 30–36.5)
MCHC RBC AUTO-ENTMCNC: 34.3 G/DL (ref 30–36.5)
MCV RBC AUTO: 87.8 FL (ref 80–99)
MCV RBC AUTO: 90 FL (ref 80–99)
METHADONE UR QL: NEGATIVE
MONOCYTES # BLD: 0.6 K/UL (ref 0–1)
MONOCYTES # BLD: 0.8 K/UL (ref 0–1)
MONOCYTES NFR BLD: 8 % (ref 5–13)
MONOCYTES NFR BLD: 8 % (ref 5–13)
NEUTS SEG # BLD: 4.4 K/UL (ref 1.8–8)
NEUTS SEG # BLD: 5.2 K/UL (ref 1.8–8)
NEUTS SEG NFR BLD: 60 % (ref 32–75)
NEUTS SEG NFR BLD: 61 % (ref 32–75)
NITRITE UR QL STRIP.AUTO: NEGATIVE
NRBC # BLD: 0 K/UL (ref 0–0.01)
NRBC # BLD: 0 K/UL (ref 0–0.01)
NRBC BLD-RTO: 0 PER 100 WBC
NRBC BLD-RTO: 0 PER 100 WBC
OPIATES UR QL: NEGATIVE
PCP UR QL: NEGATIVE
PH UR STRIP: 6 [PH] (ref 5–8)
PLATELET # BLD AUTO: 213 K/UL (ref 150–400)
PLATELET # BLD AUTO: 236 K/UL (ref 150–400)
PMV BLD AUTO: 11.3 FL (ref 8.9–12.9)
PMV BLD AUTO: 11.4 FL (ref 8.9–12.9)
POTASSIUM SERPL-SCNC: 3.6 MMOL/L (ref 3.5–5.1)
POTASSIUM SERPL-SCNC: 4.2 MMOL/L (ref 3.5–5.1)
PROT SERPL-MCNC: 7.2 G/DL (ref 6.4–8.2)
PROT SERPL-MCNC: 7.5 G/DL (ref 6.4–8.2)
PROT UR STRIP-MCNC: NEGATIVE MG/DL
PROTHROMBIN TIME: 10.2 SEC (ref 9–11.1)
RBC # BLD AUTO: 4.85 M/UL (ref 4.1–5.7)
RBC # BLD AUTO: 4.91 M/UL (ref 4.1–5.7)
RBC #/AREA URNS HPF: NORMAL /HPF (ref 0–5)
SAMPLES BEING HELD,HOLD: NORMAL
SAMPLES BEING HELD,HOLD: NORMAL
SERVICE CMNT-IMP: ABNORMAL
SODIUM SERPL-SCNC: 134 MMOL/L (ref 136–145)
SODIUM SERPL-SCNC: 134 MMOL/L (ref 136–145)
SP GR UR REFRACTOMETRY: 1.02 (ref 1–1.03)
SP1: NORMAL
SP2: NORMAL
SP3: NORMAL
THERAPEUTIC RANGE,PTTT: NORMAL SECS (ref 58–77)
TROPONIN I SERPL-MCNC: <0.05 NG/ML
TROPONIN I SERPL-MCNC: <0.05 NG/ML
UR CULT HOLD, URHOLD: NORMAL
UROBILINOGEN UR QL STRIP.AUTO: 0.2 EU/DL (ref 0.2–1)
WBC # BLD AUTO: 7.2 K/UL (ref 4.1–11.1)
WBC # BLD AUTO: 8.9 K/UL (ref 4.1–11.1)
WBC URNS QL MICRO: NORMAL /HPF (ref 0–4)

## 2020-03-06 PROCEDURE — 85018 HEMOGLOBIN: CPT

## 2020-03-06 PROCEDURE — 82272 OCCULT BLD FECES 1-3 TESTS: CPT

## 2020-03-06 PROCEDURE — 70551 MRI BRAIN STEM W/O DYE: CPT

## 2020-03-06 PROCEDURE — 83690 ASSAY OF LIPASE: CPT

## 2020-03-06 PROCEDURE — 74011250637 HC RX REV CODE- 250/637: Performed by: FAMILY MEDICINE

## 2020-03-06 PROCEDURE — 85025 COMPLETE CBC W/AUTO DIFF WBC: CPT

## 2020-03-06 PROCEDURE — 93005 ELECTROCARDIOGRAM TRACING: CPT

## 2020-03-06 PROCEDURE — 99284 EMERGENCY DEPT VISIT MOD MDM: CPT

## 2020-03-06 PROCEDURE — 80074 ACUTE HEPATITIS PANEL: CPT

## 2020-03-06 PROCEDURE — 74011250636 HC RX REV CODE- 250/636: Performed by: FAMILY MEDICINE

## 2020-03-06 PROCEDURE — 85730 THROMBOPLASTIN TIME PARTIAL: CPT

## 2020-03-06 PROCEDURE — 82962 GLUCOSE BLOOD TEST: CPT

## 2020-03-06 PROCEDURE — 80053 COMPREHEN METABOLIC PANEL: CPT

## 2020-03-06 PROCEDURE — 81001 URINALYSIS AUTO W/SCOPE: CPT

## 2020-03-06 PROCEDURE — 96375 TX/PRO/DX INJ NEW DRUG ADDON: CPT

## 2020-03-06 PROCEDURE — 94762 N-INVAS EAR/PLS OXIMTRY CONT: CPT

## 2020-03-06 PROCEDURE — 70450 CT HEAD/BRAIN W/O DYE: CPT

## 2020-03-06 PROCEDURE — C9113 INJ PANTOPRAZOLE SODIUM, VIA: HCPCS | Performed by: FAMILY MEDICINE

## 2020-03-06 PROCEDURE — 74176 CT ABD & PELVIS W/O CONTRAST: CPT

## 2020-03-06 PROCEDURE — 80307 DRUG TEST PRSMV CHEM ANLYZR: CPT

## 2020-03-06 PROCEDURE — 85610 PROTHROMBIN TIME: CPT

## 2020-03-06 PROCEDURE — 99218 HC RM OBSERVATION: CPT

## 2020-03-06 PROCEDURE — 96361 HYDRATE IV INFUSION ADD-ON: CPT

## 2020-03-06 PROCEDURE — 36415 COLL VENOUS BLD VENIPUNCTURE: CPT

## 2020-03-06 PROCEDURE — 96374 THER/PROPH/DIAG INJ IV PUSH: CPT

## 2020-03-06 PROCEDURE — 99285 EMERGENCY DEPT VISIT HI MDM: CPT

## 2020-03-06 PROCEDURE — 84484 ASSAY OF TROPONIN QUANT: CPT

## 2020-03-06 RX ORDER — GUAIFENESIN 100 MG/5ML
81 LIQUID (ML) ORAL DAILY
Status: DISCONTINUED | OUTPATIENT
Start: 2020-03-07 | End: 2020-03-07 | Stop reason: HOSPADM

## 2020-03-06 RX ORDER — ACETAMINOPHEN 325 MG/1
650 TABLET ORAL
Status: DISCONTINUED | OUTPATIENT
Start: 2020-03-06 | End: 2020-03-07 | Stop reason: HOSPADM

## 2020-03-06 RX ORDER — THERA TABS 400 MCG
1 TAB ORAL DAILY
Status: DISCONTINUED | OUTPATIENT
Start: 2020-03-07 | End: 2020-03-07 | Stop reason: HOSPADM

## 2020-03-06 RX ORDER — LORAZEPAM 2 MG/ML
0.5 INJECTION INTRAMUSCULAR
Status: COMPLETED | OUTPATIENT
Start: 2020-03-06 | End: 2020-03-06

## 2020-03-06 RX ORDER — ATORVASTATIN CALCIUM 20 MG/1
80 TABLET, FILM COATED ORAL
Status: DISCONTINUED | OUTPATIENT
Start: 2020-03-06 | End: 2020-03-07 | Stop reason: HOSPADM

## 2020-03-06 RX ORDER — SODIUM CHLORIDE 0.9 % (FLUSH) 0.9 %
5-40 SYRINGE (ML) INJECTION AS NEEDED
Status: DISCONTINUED | OUTPATIENT
Start: 2020-03-06 | End: 2020-03-06 | Stop reason: HOSPADM

## 2020-03-06 RX ORDER — ACETAMINOPHEN 650 MG/1
650 SUPPOSITORY RECTAL
Status: DISCONTINUED | OUTPATIENT
Start: 2020-03-06 | End: 2020-03-07 | Stop reason: HOSPADM

## 2020-03-06 RX ORDER — ALPRAZOLAM 0.5 MG/1
1 TABLET ORAL DAILY
Status: DISCONTINUED | OUTPATIENT
Start: 2020-03-07 | End: 2020-03-07 | Stop reason: HOSPADM

## 2020-03-06 RX ORDER — SODIUM CHLORIDE 0.9 % (FLUSH) 0.9 %
5-40 SYRINGE (ML) INJECTION EVERY 8 HOURS
Status: DISCONTINUED | OUTPATIENT
Start: 2020-03-06 | End: 2020-03-06 | Stop reason: HOSPADM

## 2020-03-06 RX ORDER — SODIUM CHLORIDE 9 MG/ML
75 INJECTION, SOLUTION INTRAVENOUS CONTINUOUS
Status: DISPENSED | OUTPATIENT
Start: 2020-03-06 | End: 2020-03-07

## 2020-03-06 RX ADMIN — PANTOPRAZOLE SODIUM 40 MG: 40 INJECTION, POWDER, FOR SOLUTION INTRAVENOUS at 19:34

## 2020-03-06 RX ADMIN — LORAZEPAM 0.5 MG: 2 INJECTION INTRAMUSCULAR; INTRAVENOUS at 16:18

## 2020-03-06 RX ADMIN — SODIUM CHLORIDE 75 ML/HR: 900 INJECTION, SOLUTION INTRAVENOUS at 18:05

## 2020-03-06 RX ADMIN — ACETAMINOPHEN 650 MG: 325 TABLET ORAL at 20:16

## 2020-03-06 RX ADMIN — ATORVASTATIN CALCIUM 80 MG: 20 TABLET, FILM COATED ORAL at 20:16

## 2020-03-06 NOTE — ED PROVIDER NOTES
11:51 AM  I have evaluated the patient as the Provider in Triage. I have reviewed His vital signs and the triage nurse assessment. I have talked with the patient and any available family and advised that I am the provider in triage and have ordered the appropriate study to initiate their work up based on the clinical presentation during my assessment. I have advised that the patient will be accommodated in the Main ED as soon as possible. I have also requested to contact the triage nurse or myself immediately if the patient experiences any changes in their condition during this brief waiting period. Was seen in the ED last night for black tarry stools, was discharged with no acute findings and stable hemoglobin. Now returns with acute onset headache and weakness with expressive aphasia. Started 20 minutes ago. Rosa House NP    12:06 PM  Mio Ponce is a 38 y.o. M PMH chronic back pain, GERD who presents for aphasia. States at 11:30 he noticed blurred vision in his L eye. Followed soon after by a headache bitemporal headache and expressive aphasia. Notes he felt weak in his legs and had trouble walking. It was noted gait in triage was normal. Notes he feels dizzy and light headed. No chest pain or shortness of breath.           Past Medical History:   Diagnosis Date    Chronic pain     back pain    GERD (gastroesophageal reflux disease)        Past Surgical History:   Procedure Laterality Date    HX HERNIA REPAIR Left     inguinal hernia repair    HX HERNIA REPAIR      as an infant         Family History:   Problem Relation Age of Onset    No Known Problems Mother     No Known Problems Father        Social History     Socioeconomic History    Marital status: SINGLE     Spouse name: Not on file    Number of children: Not on file    Years of education: Not on file    Highest education level: Not on file   Occupational History    Not on file   Social Needs    Financial resource strain: Not on file    Food insecurity:     Worry: Not on file     Inability: Not on file    Transportation needs:     Medical: Not on file     Non-medical: Not on file   Tobacco Use    Smoking status: Former Smoker     Years: 15.00    Smokeless tobacco: Never Used    Tobacco comment: 1 pack/week   Substance and Sexual Activity    Alcohol use: Yes     Alcohol/week: 0.0 standard drinks     Types: 4 - 5 Shots of liquor per week    Drug use: Yes     Types: Marijuana    Sexual activity: Not on file   Lifestyle    Physical activity:     Days per week: Not on file     Minutes per session: Not on file    Stress: Not on file   Relationships    Social connections:     Talks on phone: Not on file     Gets together: Not on file     Attends [employer] service: Not on file     Active member of club or organization: Not on file     Attends meetings of clubs or organizations: Not on file     Relationship status: Not on file    Intimate partner violence:     Fear of current or ex partner: Not on file     Emotionally abused: Not on file     Physically abused: Not on file     Forced sexual activity: Not on file   Other Topics Concern    Not on file   Social History Narrative    Not on file         ALLERGIES: Benadryl [diphenhydramine hcl]    Review of Systems   Constitutional: Negative for chills and fever. Eyes: Positive for visual disturbance. Respiratory: Negative for shortness of breath. Cardiovascular: Negative for chest pain. Gastrointestinal: Negative for abdominal pain, nausea and vomiting. Neurological: Positive for dizziness, speech difficulty, weakness, light-headedness and headaches. Vitals:    03/06/20 1148   BP: 146/87   Pulse: 88   Resp: 18   Temp: 98.3 °F (36.8 °C)   SpO2: 97%            Physical Exam  Vitals signs and nursing note reviewed. Constitutional:       General: He is not in acute distress. Appearance: Normal appearance. He is not ill-appearing or toxic-appearing.    Cardiovascular: Rate and Rhythm: Normal rate and regular rhythm. Heart sounds: No murmur. Pulmonary:      Effort: Pulmonary effort is normal. No respiratory distress. Breath sounds: Normal breath sounds. Neurological:      General: No focal deficit present. Mental Status: He is alert and oriented to person, place, and time. Cranial Nerves: No cranial nerve deficit. Sensory: No sensory deficit. Motor: No weakness. Coordination: Coordination normal.      Comments: CN II-XII intact. Finger to nose normal BL. No drift. SCOTT. Gross sensation intact. Strength intact BL. Moves all extremities normally. Alert and oriented. Speech appropriate. MDM  Number of Diagnoses or Management Options     Amount and/or Complexity of Data Reviewed  Clinical lab tests: ordered and reviewed  Tests in the radiology section of CPT®: ordered and reviewed  Review and summarize past medical records: yes  Discuss the patient with other providers: yes  Independent visualization of images, tracings, or specimens: yes    Risk of Complications, Morbidity, and/or Mortality  Presenting problems: high  Diagnostic procedures: moderate  Management options: moderate    Patient Progress  Patient progress: stable         Procedures    12:16 PM  Tele Neuro to evaluate patient. Dr. Wanda Harris spoke with Dr. Fritz Coffey who does not believe it is a stroke but warrants admission for further workup. CT head shows no acute process. Hospitalist Nikky Serve for Admission  1:05 PM    ED Room Number: ER01/01  Patient Name and age:  Erlinda Maldonado 38 y.o.  male  Working Diagnosis:   1. TIA (transient ischemic attack)      Readmission: no  Isolation Requirements:  no  Recommended Level of Care:  telemetry  Code Status:  Full Code  Department[facility] - [phone]  Other:  teleneuro saw pt. They don't think cva but would like him admitted for hawkins.

## 2020-03-06 NOTE — PROGRESS NOTES
3/6/2020  2:12 PM  Case management note    Reason for Admission:   TIA  Patient came to hospital early am for abdominal pain. He was discharged. He came back later for some asphasia and eye blurred. Patient is a twin and lives independent with his brother. He works and drives. He has history of chronic back pain, and anxiety issues. Currently he is not seeing a psychiatry due to MD retiring. His mother is at bedside. [facility] [address]  OBS educated, letter signed and placed in chart. RUR Score:     moderate             PCP: First and Last name:  Jennifer Pruitt   Name of Practice: [facility]   Are you a current patient: Yes/No: yes   Approximate date of last visit:  2 weeks ago    Do you (patient/family) have any concerns for transition/discharge? Concern for patient health cognition               Plan for utilizing home health:   Patient is independent, drives and works, will most likely not qualify for home health services    Current Advanced Directive/Advance Care Plan:  No advance care planning            Transition of Care Plan:          1. Home with family assistance  2. PCP follow up  3. Long term planning  4. AD planning  5.  CM to follow until discharge    Care Management Interventions  PCP Verified by CM: Yes(dr. Florentino Kilgore)  Mode of Transport at Discharge: Self  Transition of Care Consult (CM Consult): Discharge Planning  Current Support Network: Relative's Home  Confirm Follow Up Transport: Family  The Plan for Transition of Care is Related to the Following Treatment Goals : TIA  Discharge Location  Discharge Placement: Home with family assistance  Effie Colon, [address]

## 2020-03-06 NOTE — ED TRIAGE NOTES
Pt reports 20 mins of expressive aphasia with headache. Pt was seen here this morning for black stool.

## 2020-03-06 NOTE — PROGRESS NOTES
H&P dictated  Job #[id number]        Briefly, patient is a 50-year-old gentleman with past medical history of chronic back pain, anxiety, who presents to the ER with aphasia, headache, left sided weakness. All symptoms resolved prior to arrival in the ER. Requests to be admitted for TIA work-up. Family reports that he has had some blood per rectum in the last few few weeks but has not had any abnormal bowel movements in the last week or so. Assessment and plan  1. TIA: MRI, aspirin, neurology consult, echo, neurochecks, supportive care close monitoring  2. GI bleed: GI consult, H&H monitoring, Protonix twice daily, n.p.o., supportive care  3.   Elevated LFTs check acetaminophen level, hepatitis panel, CT of the abdomen, supportive care GI consult        See dictated H&P for Details

## 2020-03-06 NOTE — PROGRESS NOTES
Spiritual Care Assessment/Progress Note  Meredith Sidhu      NAME: Nata Costa      MRN: [id number]  AGE: 38 y.o.  SEX: male  Zoroastrianism Affiliation: Mosque   Language: English     3/6/2020     Total Time (in minutes): 11     Spiritual Assessment begun in [facility] EMERGENCY DEPT through conversation with:         [x]Patient        [x] Family    [] Friend(s)        Reason for Consult: Initial/Spiritual assessment, patient floor     Spiritual beliefs: (Please include comment if needed)     [x] Identifies with a braeden tradition: Mosque        [] Supported by a braeden community:            [] Claims no spiritual orientation:           [] Seeking spiritual identity:                [] Adheres to an individual form of spirituality:           [] Not able to assess:                           Identified resources for coping:      [x] Prayer                               [] Music                  [] Guided Imagery     [x] Family/friends                 [] Pet visits     [] Devotional reading                         [] Unknown     [] Other:                                                Interventions offered during this visit: (See comments for more details)    Patient Interventions: Initial/Spiritual assessment, patient floor, Prayer (actual), Zoroastrianism beliefs/image of God discussed, Affirmation of emotions/emotional suffering     Family/Friend(s): Initial Assessment, Affirmation of emotions/emotional suffering, Affirmation of braeden     Plan of Care:     [] Support spiritual and/or cultural needs    [] Support AMD and/or advance care planning process      [] Support grieving process   [] Coordinate Rites and/or Rituals    [] Coordination with community clergy   [] No spiritual needs identified at this time   [] Detailed Plan of Care below (See Comments)  [] Make referral to Music Therapy  [] Make referral to Pet Therapy     [] Make referral to Addiction services  [] Make referral to [facility]  [] Make referral to Spiritual Care Partner  [] No future visits requested        [x] Follow up visits as needed     Comments:   visited with pt. Viola Doyle, at the ER for initial spiritual assessment, in response to a code stroke call. Pt's mother was present during the visit. Pt expressed concern about a  visiting at this time, and inquired whether there was any unpleasant news about his condition.  explained that his mission was not to deliver unpleasant news, but rather to offer emotional and spiritual support. Pt's mother indicated that pt has suffered several medical challenges and requested for prayer for God to give him relief.  provided requested prayer and encouraged pt. to take it moment by moment. Viola Doyle and his mother appeared encouraged by the visit, and they were informed of spiritual care support around the clock. They expressed much appreciation for the visit. Visited by: Braulio Baeza.   Anival gillespie : [phone]

## 2020-03-06 NOTE — PROGRESS NOTES
[facility]: MED RECONCILIATION    Comments/Recommendations:   Patient alert and oriented for medication reconciliation and knowledgeable regarding medications. Patient stopped oxycodone/APAP 7.5-325 ~2-3 weeks ago, patient had been tapering off for a while and is now completely off. Discussed allergies, patient stated benadryl made him feel like a zombie but states he could need it if needed for an allergic reaction. Confirmed preferred pharmacy as [facility] on [address]. Medications added:     none    Medications removed: Atorvastatin  Carisoprodol  Oxycodone/APAP    Medications adjusted:    Alprazolam 1mg daily adjusted from 0.5mg PRN    Information obtained from: [facility], [facility], [facility]     Allergies: Benadryl [diphenhydramine hcl]    Prior to Admission Medications:     Medication Documentation Review Audit       Reviewed by Monroe Pérez (Pharmacist) on 03/06/20 at 1344      Medication Sig Documenting Provider Last Dose Status Taking? ALPRAZolam (XANAX) 1 mg tablet Take 1 mg by mouth daily. Provider, Historical 3/6/2020 Active Yes   therapeutic multivitamin (THERAGRAN) tablet Take 1 Tab by mouth daily.  Provider, Historical 3/6/2020 Active Yes                      Thank you,   Shlomo Bearden, PharmD, BCPS   Contact: [phone]

## 2020-03-06 NOTE — ED NOTES
Verbal report given to [facility] who will assume care of patient when he returns from MRI.

## 2020-03-06 NOTE — PROGRESS NOTES
1815: Spoke with speech regarding repeating bedside swallow as patient was requesting water/and his typical oral medications. She stated it was appropriate to repeat STAND as patient didn't have initial difficulty with thins, and just an effortful swallow with puree. Repeat STAND complete and documented in [facility]. Patient without any difficulty swallowing. States his throat is sore, but he has been vomiting for a few days at home and feels as though that's why. Notified Dr. Michael Kinney, and regarding patient requesting additional anxiety medication. 1840: MD states to keep patient NPO, and that he will place anxiety medication order for this evening per patient request. Updated patient on the above.

## 2020-03-06 NOTE — DISCHARGE INSTRUCTIONS

## 2020-03-06 NOTE — ED TRIAGE NOTES
Triage:\" For 2 weeks I have black stools\". Also reports that it is difficult to urinate, \" it feels swollen\". Denies burning, pain, frequency, reports clear in color. Saw his PCP a few weeks ago and was told his liver enzymes were high. Denies liver problems.

## 2020-03-06 NOTE — H&P
Jalen Sotelo [place] 78  HISTORY AND PHYSICAL    Name:  Edward Jimenez  MR#:  [id number]  :  1980  ACCOUNT #:  [de-identified]  ADMIT DATE:  2020    CHIEF COMPLAINT:  Transient aphasia. HISTORY OF PRESENT ILLNESS:  The patient is a 79-year-old gentleman with past medical history of chronic back pain and GERD, who presents to the hospital with the above-mentioned symptoms. History was primarily obtained from the patient. The patient reports that around 11:30, he noticed some blurred vision in his left eye, soon after that he started having some trouble talking. The patient reports that he also had a headache and felt weak in his left leg and had trouble walking. The patient reports all his symptoms have resolved currently. The patient reports that he also had some dizziness associated with his symptoms, but that has resolved. The patient reports that he has chronic back pain for which he takes pain medication. Per the patient's family member, the patient has had some blood in his stool in the last week or so, but that seems to have resolved. The patient reports that he has some mild abdominal pain associated with his symptoms, but no other acute concerns or problems. The patient was seen in the ER and was requested to be admitted under the hospitalist service. The patient currently denies any headache, blurry vision, sore throat, trouble swallowing, trouble with speech, any chest pain, shortness of breath, cough, fever, chills, constipation, diarrhea, urinary symptoms, focal or generalized neurological weakness, recent travel, sick contacts, falls, injuries, hematemesis, melena, hemoptysis, hematuria or any other concerns or problems. PAST MEDICAL HISTORY:  See above. HOME MEDICATIONS:  Currently, the patient is on  1. Xanax 1 mg daily. 2.  Therapeutic multivitamin one tablet daily. ALLERGIES:  BENADRYL.     SOCIAL HISTORY:  Former smoker, used to smoke one pack per day for 15 years. Occasional alcohol. Smokes marijuana. FAMILY HISTORY:  His mother and father had no known medical problems. REVIEW OF SYMPTOMS:  All systems were reviewed and found to be essentially negative except for the symptoms mentioned above. PHYSICAL EXAMINATION:  VITAL SIGNS:  Temperature 98.3, pulse 90, respiratory rate 18, blood pressure 153/87, pulse oximetry 98% on room air. GENERAL:  Alert x3, awake, mildly distressed, pleasant male, appears to be stated age. HEENT:  Pupils equal and reactive to light. Dry mucous membranes. Tympanic membranes clear. NECK:  Supple. CHEST:  Clear to auscultation bilaterally. HEART:  S1 and S2 heard. ABDOMEN:  Soft, nontender, nondistended. Bowel sounds are physiological.  EXTREMITIES:  No clubbing, no cyanosis, no edema. NEURO/PSYCH:  Pleasant mood and affect. Cranial nerves II through XII grossly intact. Sensory grossly within normal limits. DTRs 2+ x4. Strength 5/5. SKIN:  Warm. LABORATORY DATA:  White count 7.2, hemoglobin 14.6, hematocrit 42.6, platelets 934. INR 1. Sodium 134, potassium 4.2, chloride 103, bicarbonate 23, anion gap 8, glucose 102, BUN 22, creatinine 1.02, calcium 8.6, bilirubin total 0.6, ALT 97, AST 74, alkaline phosphatase 65. Troponin less than 0.05. CT of the head shows no acute intracranial process. ASSESSMENT AND PLAN:  1. Transient ischemic attack: The patient will be observed on a telemetry bed. Obtain an MRI of the brain, neurovascular checks. Start the patient on aspirin with close monitoring as the patient has had some recent gastrointestinal bleed, statin, physical therapy/occupational therapy/speech consult, supportive care, echocardiogram, and await Neurology input. Continue to closely monitor. Further intervention per hospital course. Reassess as needed. 2.  Gastrointestinal bleed: The patient has been somewhat vague about his symptoms.   We will keep the patient n.p.o.  Gastroenterology consult, Protonix b.i.d., monitor H and H, and further intervention per hospital course. Reassess as needed. Transfuse if needed. Monitor for now. H and H currently appears to be stable. 3.  Elevated liver function tests:  Unclear etiology. The patient has been chronically using acetaminophen. We will get acetaminophen level. We will get hepatitis panel. We will get a CT of the abdomen and pelvis. May consider getting a hepatology/gastroenterology consult. We will provide IV hydration. Repeat labs in the morning and supportive care. Continue to monitor. Reassess as needed. 4.  Chronic back pain:  Continue to monitor. Fall precautions. 5.  Dehydration:  Gentle IV hydration. Repeat labs in the morning. 6.  Gastrointestinal/deep vein thrombosis prophylaxis:  The patient will be on sequential compression devices.       Loretta Lucas MD [id number]  D:  03/06/2020 15:59  T:  03/06/2020 17:06  JOB #:  [id number]

## 2020-03-06 NOTE — ED PROVIDER NOTES
History of chronic back pain, GERD; he presents with complaints of a several week history of black stools. He also complains of epigastric abdominal pain, stomach swelling, and metallic taste in his mouth, and difficulty urinating and passing gas at times. He states that he saw his PCP a week ago and had unremarkable blood work and a negative Hemoccult test.  He denies fever, and nausea, vomiting. He has had good p.o. intake. He denies any increased stress. He states that he takes Prilosec regularly. Past Medical History:   Diagnosis Date    Chronic pain     back pain    GERD (gastroesophageal reflux disease)        Past Surgical History:   Procedure Laterality Date    HX HERNIA REPAIR Left     inguinal hernia repair    HX HERNIA REPAIR      as an infant         Family History:   Problem Relation Age of Onset    No Known Problems Mother     No Known Problems Father        Social History     Socioeconomic History    Marital status: SINGLE     Spouse name: Not on file    Number of children: Not on file    Years of education: Not on file    Highest education level: Not on file   Occupational History    Not on file   Social Needs    Financial resource strain: Not on file    Food insecurity:     Worry: Not on file     Inability: Not on file    Transportation needs:     Medical: Not on file     Non-medical: Not on file   Tobacco Use    Smoking status: Former Smoker     Years: 15.00    Smokeless tobacco: Never Used    Tobacco comment: 1 pack/week   Substance and Sexual Activity    Alcohol use:  Yes     Alcohol/week: 0.0 standard drinks     Types: 4 - 5 Shots of liquor per week    Drug use: Yes     Types: Marijuana    Sexual activity: Not on file   Lifestyle    Physical activity:     Days per week: Not on file     Minutes per session: Not on file    Stress: Not on file   Relationships    Social connections:     Talks on phone: Not on file     Gets together: Not on file     Attends Christianity service: Not on file     Active member of club or organization: Not on file     Attends meetings of clubs or organizations: Not on file     Relationship status: Not on file    Intimate partner violence:     Fear of current or ex partner: Not on file     Emotionally abused: Not on file     Physically abused: Not on file     Forced sexual activity: Not on file   Other Topics Concern    Not on file   Social History Narrative    Not on file         ALLERGIES: Benadryl [diphenhydramine hcl]    Review of Systems   All other systems reviewed and are negative. Vitals:    03/06/20 0503   BP: (!) 146/94   Pulse: 85   Resp: 20   Temp: 98.5 °F (36.9 °C)   SpO2: 98%   Weight: 79.4 kg (175 lb)   Height: 5' 7\" (1.702 m)            Physical Exam  Vitals signs and nursing note reviewed. Constitutional:       Appearance: He is well-developed. HENT:      Head: Normocephalic and atraumatic. Eyes:      Conjunctiva/sclera: Conjunctivae normal.   Neck:      Musculoskeletal: Neck supple. Trachea: No tracheal deviation. Cardiovascular:      Rate and Rhythm: Normal rate and regular rhythm. Heart sounds: Normal heart sounds. No murmur. No friction rub. No gallop. Pulmonary:      Effort: Pulmonary effort is normal.      Breath sounds: Normal breath sounds. Abdominal:      Palpations: Abdomen is soft. Tenderness: There is no abdominal tenderness. Genitourinary:     Comments: No stool noted on glove upon rectal exam.  Musculoskeletal:         General: No deformity. Skin:     General: Skin is warm and dry. Neurological:      Mental Status: He is alert. Comments: oriented          MDM       Procedures    Progress Note:  Results, treatment, and follow up plan have been discussed with patient. Questions were answered. Lianne Kim MD  6:40 AM    Assessment/plan: Patient presents with concerns about black stools.   He is also complaining of epigastric abdominal pain and difficulty urinating at times. His appearance/exam are reassuring with normal vital signs. CBC is normal.  Hemoccult negative. CMP remarkable for mild elevation of LFTs only. PCP/GI follow-up.   Mai Galeazzi, MD  6:41 AM

## 2020-03-06 NOTE — ED NOTES
Dr. Tran Mata has reviewed discharge instructions with the patient. The patient verbalized understanding.

## 2020-03-06 NOTE — PROGRESS NOTES
SLP Contact Note    Contacted by RN re: patient failing STAND. Per report, pt had difficulty with puree consistency 2/2 sore throat and appeared to have an effortful swallow. Given that brain MRI is negative for any acute infarct and pt without any other risk factors for dysphagia, discussed with RN that it would be appropriate for her to repeat STAND. Should pt continue to have overt difficulty, will complete formal swallow evaluation as indicated.        Thank you,  Homa Perez M.Ed, CCC-SLP   Speech-Language

## 2020-03-07 ENCOUNTER — APPOINTMENT (OUTPATIENT)
Dept: NON INVASIVE DIAGNOSTICS | Age: 40
End: 2020-03-07
Attending: FAMILY MEDICINE
Payer: MEDICAID

## 2020-03-07 ENCOUNTER — APPOINTMENT (OUTPATIENT)
Dept: VASCULAR SURGERY | Age: 40
End: 2020-03-07
Attending: PSYCHIATRY & NEUROLOGY
Payer: MEDICAID

## 2020-03-07 VITALS
HEART RATE: 75 BPM | DIASTOLIC BLOOD PRESSURE: 77 MMHG | OXYGEN SATURATION: 96 % | TEMPERATURE: 99 F | RESPIRATION RATE: 13 BRPM | HEIGHT: 67 IN | SYSTOLIC BLOOD PRESSURE: 133 MMHG | BODY MASS INDEX: 28.41 KG/M2 | WEIGHT: 181 LBS

## 2020-03-07 LAB
AV VELOCITY RATIO: 0.63
CHOLEST SERPL-MCNC: 213 MG/DL
COMMENT, HOLDF: NORMAL
ECHO AO ASC DIAM: 2.86 CM
ECHO AO ROOT DIAM: 3.65 CM
ECHO AV AREA PEAK VELOCITY: 2.5 CM2
ECHO AV AREA/BSA PEAK VELOCITY: 1.3 CM2/M2
ECHO AV PEAK GRADIENT: 5.2 MMHG
ECHO AV PEAK VELOCITY: 114.45 CM/S
ECHO EST RA PRESSURE: 3 MMHG
ECHO IVC SNIFF: 1.74 CM
ECHO LA AREA 4C: 13.4 CM2
ECHO LA MAJOR AXIS: 3.14 CM
ECHO LA TO AORTIC ROOT RATIO: 0.86
ECHO LA VOL 2C: 35.71 ML (ref 18–58)
ECHO LA VOL 4C: 31.58 ML (ref 18–58)
ECHO LA VOL BP: 34.8 ML (ref 18–58)
ECHO LA VOL/BSA BIPLANE: 17.96 ML/M2 (ref 16–28)
ECHO LA VOLUME INDEX A2C: 18.43 ML/M2 (ref 16–28)
ECHO LA VOLUME INDEX A4C: 16.29 ML/M2 (ref 16–28)
ECHO LV E' LATERAL VELOCITY: 12 CENTIMETER/SECOND
ECHO LV E' SEPTAL VELOCITY: 9.77 CENTIMETER/SECOND
ECHO LV EDV TEICHHOLZ: 59.12 ML
ECHO LV ESV TEICHHOLZ: 25.94 ML
ECHO LV INTERNAL DIMENSION DIASTOLIC: 4.97 CM (ref 4.2–5.9)
ECHO LV INTERNAL DIMENSION SYSTOLIC: 3.51 CM
ECHO LV IVSD: 0.67 CM (ref 0.6–1)
ECHO LV MASS 2D: 128.9 G (ref 88–224)
ECHO LV MASS INDEX 2D: 66.5 G/M2 (ref 49–115)
ECHO LV POSTERIOR WALL DIASTOLIC: 0.74 CM (ref 0.6–1)
ECHO LVOT DIAM: 2.27 CM
ECHO LVOT PEAK GRADIENT: 2 MMHG
ECHO LVOT PEAK VELOCITY: 71.55 CM/S
ECHO MV A VELOCITY: 57.28 CM/S
ECHO MV AREA PHT: 10.4 CM2
ECHO MV E DECELERATION TIME (DT): 72.8 MS
ECHO MV E VELOCITY: 78.58 CM/S
ECHO MV E/A RATIO: 1.37
ECHO MV PRESSURE HALF TIME (PHT): 21.1 MS
ECHO PULMONARY ARTERY SYSTOLIC PRESSURE (PASP): 27.3 MMHG
ECHO RIGHT VENTRICULAR SYSTOLIC PRESSURE (RVSP): 27.3 MMHG
ECHO RV INTERNAL DIMENSION: 3.02 CM
ECHO RV TAPSE: 1.56 CM (ref 1.5–2)
ECHO TV REGURGITANT MAX VELOCITY: 246.6 CM/S
ECHO TV REGURGITANT PEAK GRADIENT: 24.3 MMHG
EST. AVERAGE GLUCOSE BLD GHB EST-MCNC: 108 MG/DL
HBA1C MFR BLD: 5.4 % (ref 4–5.6)
HCT VFR BLD AUTO: 43.7 % (ref 36.6–50.3)
HCT VFR BLD AUTO: 44 % (ref 36.6–50.3)
HDLC SERPL-MCNC: 41 MG/DL
HDLC SERPL: 5.2 {RATIO} (ref 0–5)
HEMOCCULT STL QL: NEGATIVE
HGB BLD-MCNC: 14.6 G/DL (ref 12.1–17)
HGB BLD-MCNC: 15 G/DL (ref 12.1–17)
LDLC SERPL CALC-MCNC: ABNORMAL MG/DL (ref 0–100)
LDLC SERPL DIRECT ASSAY-MCNC: 130 MG/DL (ref 0–100)
LIPID PROFILE,FLP: ABNORMAL
LVFS 2D: 29.42 %
LVSV (TEICH): 33.19 ML
MV DEC SLOPE: 10.79
SAMPLES BEING HELD,HOLD: NORMAL
TRIGL SERPL-MCNC: 402 MG/DL (ref ?–150)
TROPONIN I SERPL-MCNC: <0.05 NG/ML
TROPONIN I SERPL-MCNC: <0.05 NG/ML
VLDLC SERPL CALC-MCNC: ABNORMAL MG/DL

## 2020-03-07 PROCEDURE — 85018 HEMOGLOBIN: CPT

## 2020-03-07 PROCEDURE — 83721 ASSAY OF BLOOD LIPOPROTEIN: CPT

## 2020-03-07 PROCEDURE — 36415 COLL VENOUS BLD VENIPUNCTURE: CPT

## 2020-03-07 PROCEDURE — 82272 OCCULT BLD FECES 1-3 TESTS: CPT

## 2020-03-07 PROCEDURE — 93880 EXTRACRANIAL BILAT STUDY: CPT

## 2020-03-07 PROCEDURE — 96376 TX/PRO/DX INJ SAME DRUG ADON: CPT

## 2020-03-07 PROCEDURE — 93306 TTE W/DOPPLER COMPLETE: CPT

## 2020-03-07 PROCEDURE — 84484 ASSAY OF TROPONIN QUANT: CPT

## 2020-03-07 PROCEDURE — 74011250636 HC RX REV CODE- 250/636: Performed by: FAMILY MEDICINE

## 2020-03-07 PROCEDURE — 83036 HEMOGLOBIN GLYCOSYLATED A1C: CPT

## 2020-03-07 PROCEDURE — C9113 INJ PANTOPRAZOLE SODIUM, VIA: HCPCS | Performed by: FAMILY MEDICINE

## 2020-03-07 PROCEDURE — 99218 HC RM OBSERVATION: CPT

## 2020-03-07 PROCEDURE — 80061 LIPID PANEL: CPT

## 2020-03-07 PROCEDURE — 74011250637 HC RX REV CODE- 250/637: Performed by: FAMILY MEDICINE

## 2020-03-07 PROCEDURE — 74011250637 HC RX REV CODE- 250/637: Performed by: INTERNAL MEDICINE

## 2020-03-07 RX ORDER — ATORVASTATIN CALCIUM 20 MG/1
20 TABLET, FILM COATED ORAL
Qty: 30 TAB | Refills: 0 | Status: SHIPPED | OUTPATIENT
Start: 2020-03-07

## 2020-03-07 RX ORDER — GUAIFENESIN 100 MG/5ML
81 LIQUID (ML) ORAL DAILY
Qty: 30 TAB | Refills: 0 | Status: SHIPPED
Start: 2020-03-08

## 2020-03-07 RX ORDER — PANTOPRAZOLE SODIUM 40 MG/1
40 TABLET, DELAYED RELEASE ORAL
Status: DISCONTINUED | OUTPATIENT
Start: 2020-03-07 | End: 2020-03-07 | Stop reason: HOSPADM

## 2020-03-07 RX ORDER — ALPRAZOLAM 0.5 MG/1
1 TABLET ORAL ONCE
Status: COMPLETED | OUTPATIENT
Start: 2020-03-07 | End: 2020-03-07

## 2020-03-07 RX ORDER — HYDROGEN PEROXIDE 3 %
20 SOLUTION, NON-ORAL MISCELLANEOUS 2 TIMES DAILY
Qty: 60 CAP | Refills: 0 | Status: SHIPPED
Start: 2020-03-07

## 2020-03-07 RX ADMIN — THERA TABS 1 TABLET: TAB at 10:06

## 2020-03-07 RX ADMIN — ALPRAZOLAM 1 MG: 0.5 TABLET ORAL at 09:59

## 2020-03-07 RX ADMIN — ASPIRIN 81 MG 81 MG: 81 TABLET ORAL at 09:59

## 2020-03-07 RX ADMIN — ACETAMINOPHEN 650 MG: 325 TABLET ORAL at 08:31

## 2020-03-07 RX ADMIN — PANTOPRAZOLE SODIUM 40 MG: 40 INJECTION, POWDER, FOR SOLUTION INTRAVENOUS at 09:59

## 2020-03-07 RX ADMIN — ACETAMINOPHEN 650 MG: 325 TABLET ORAL at 04:36

## 2020-03-07 RX ADMIN — ALPRAZOLAM 1 MG: 0.5 TABLET ORAL at 00:34

## 2020-03-07 NOTE — CONSULTS
JESSE Alexis MD  [phone] office     Gastroenterology Consultation Note      Admit Date: 3/6/2020  Consult Date: 3/7/2020   I greatly appreciate your asking me to see Phil Armstrong, thank you very much for the opportunity to participate in his care. Narrative Assessment and Plan   · 38year old male who presented with aphasia who also reports incidental melena. His Hgb is normal, and he is stable hemodynamically. He has a mild transaminitis likely related to hepatic steatosis. At this time, would work up his aphasia. His GI symptoms will require outpatient follow-up, which we can arrange. Daily oral PPI therapy is reasonable. Will follow. Subjective:     Chief Complaint: Melena    History of Present Illness:     Mr. Adriana Ruiz is a 38year old male who presented with aphasia concerning for a possible TIA. His Neurology evaluation is in progress. From a GI standpoint, the patient also reports dark stools for the past few weeks. This does not occur every day, and today his stool was normal. He has used Pepto-Bismol on occasion but not recently. He denies NSAID use. Her reports having had a prior EGD and Colonoscopy by Dr. Nathan Briceño about 4 years ago. He also has a mild transaminitis. His CT was remarkable only for hepatic steatosis. His hepatitis serologies are negative. He denies any significant abdominal pain. Jose L Amaya DO    Past Medical History:   Diagnosis Date    Chronic pain     back pain    GERD (gastroesophageal reflux disease)         Past Surgical History:   Procedure Laterality Date    HX HERNIA REPAIR Left     inguinal hernia repair    HX HERNIA REPAIR      as an infant       Social History     Tobacco Use    Smoking status: Former Smoker     Years: 15.00    Smokeless tobacco: Never Used    Tobacco comment: 1 pack/week   Substance Use Topics    Alcohol use:  Yes     Alcohol/week: 0.0 standard drinks     Types: 4 - 5 Shots of liquor per week Family History   Problem Relation Age of Onset    No Known Problems Mother     No Known Problems Father         Allergies   Allergen Reactions    Benadryl [Diphenhydramine Hcl] Other (comments)     \"patient states it just takes a long time to wear off\", felt \"zombie-like\"            Home Medications:  Prior to Admission Medications   Prescriptions Last Dose Informant Patient Reported? Taking? ALPRAZolam (XANAX) 1 mg tablet 3/6/2020 Self Yes Yes   Sig: Take 1 mg by mouth daily. therapeutic multivitamin [facility]) tablet 3/6/2020 Self Yes Yes   Sig: Take 1 Tab by mouth daily. Facility-Administered Medications: None       Hospital Medications:  Current Facility-Administered Medications   Medication Dose Route Frequency    ALPRAZolam (XANAX) tablet 1 mg  1 mg Oral DAILY    therapeutic multivitamin (THERAGRAN) tablet 1 Tab  1 Tab Oral DAILY    acetaminophen (TYLENOL) tablet 650 mg  650 mg Oral Q4H PRN    Or    acetaminophen (TYLENOL) solution 650 mg  650 mg Per NG tube Q4H PRN    Or    acetaminophen (TYLENOL) suppository 650 mg  650 mg Rectal Q4H PRN    0.9% sodium chloride infusion  75 mL/hr IntraVENous CONTINUOUS    aspirin chewable tablet 81 mg  81 mg Oral DAILY    atorvastatin (LIPITOR) tablet 80 mg  80 mg Oral QHS    pantoprazole (PROTONIX) 40 mg in 0.9% sodium chloride 10 mL injection  40 mg IntraVENous Q12H     Current Outpatient Medications   Medication Sig    ALPRAZolam (XANAX) 1 mg tablet Take 1 mg by mouth daily.  therapeutic multivitamin (THERAGRAN) tablet Take 1 Tab by mouth daily. Review of Systems:  Full ROS was done and was negative except as noted in the HPI.      Objective:     Physical Exam:  Visit Vitals  /78 (BP 1 Location: Right arm, BP Patient Position: At rest)   Pulse 70   Temp 99 °F (37.2 °C)   Resp 13   Ht 5' 7\" (1.702 m)   Wt 82.1 kg (181 lb)   SpO2 96%   BMI 28.35 kg/m²     SpO2 Readings from Last 6 Encounters:   03/07/20 96%   03/06/20 96% 03/29/19 97%   11/07/18 97%   12/11/15 100%   12/10/14 97%            Intake/Output Summary (Last 24 hours) at 3/7/2020 1427  Last data filed at 3/7/2020 0600  Gross per 24 hour   Intake 893.75 ml   Output    Net 893.75 ml      General: no distress, comfortable  Skin:  No rash or jaundice  HEENT: Pupils equal, sclera anicteric  Cardiovascular: Regular, well perfused, no edema  Respiratory:  Clear bilaterally, normal respiratory effort  GI:  Abdomen soft, nondistended, nontender  Musculoskeletal:  No skeletal deformity nor acute arthritis noted. Neurological:  Motor and sensory function intact in upper extremities  Psychiatric:  Normal affect, memory intact, appears to have insight into current illness    Laboratory:    Recent Results (from the past 24 hour(s))   DRUG SCREEN, URINE    Collection Time: 03/06/20  3:30 PM   Result Value Ref Range    AMPHETAMINES NEGATIVE  NEG      BARBITURATES NEGATIVE  NEG      BENZODIAZEPINES NEGATIVE  NEG      COCAINE NEGATIVE  NEG      METHADONE NEGATIVE  NEG      OPIATES NEGATIVE  NEG      PCP(PHENCYCLIDINE) NEGATIVE  NEG      THC (TH-CANNABINOL) NEGATIVE  NEG      Drug screen comment (NOTE)    HGB & HCT    Collection Time: 03/06/20  7:23 PM   Result Value Ref Range    HGB 14.1 12.1 - 17.0 g/dL    HCT 41.1 36.6 - 50.3 %   TROPONIN I    Collection Time: 03/06/20  7:23 PM   Result Value Ref Range    Troponin-I, Qt. <0.05 <0.05 ng/mL   TROPONIN I    Collection Time: 03/07/20  3:41 AM   Result Value Ref Range    Troponin-I, Qt. <0.05 <0.05 ng/mL   LIPID PANEL    Collection Time: 03/07/20  3:41 AM   Result Value Ref Range    LIPID PROFILE          Cholesterol, total 213 (H) <200 MG/DL    Triglyceride 402 (H) <150 MG/DL    HDL Cholesterol 41 MG/DL    LDL, calculated Not calculated due to elevated triglyceride level 0 - 100 MG/DL    VLDL, calculated  MG/DL     Calculation not valid with this patient's other Lipid values.     CHOL/HDL Ratio 5.2 (H) 0.0 - 5.0     HEMOGLOBIN A1C WITH EAG Collection Time: 03/07/20  3:41 AM   Result Value Ref Range    Hemoglobin A1c 5.4 4.0 - 5.6 %    Est. average glucose 108 mg/dL   HGB & HCT    Collection Time: 03/07/20  3:41 AM   Result Value Ref Range    HGB 15.0 12.1 - 17.0 g/dL    HCT 44.0 36.6 - 50.3 %   LDL, DIRECT    Collection Time: 03/07/20  3:41 AM   Result Value Ref Range    LDL,Direct 130 (H) 0 - 100 mg/dl   OCCULT BLOOD, STOOL    Collection Time: 03/07/20  3:50 AM   Result Value Ref Range    Occult blood, stool NEGATIVE  NEG     ECHO ADULT COMPLETE    Collection Time: 03/07/20  7:53 AM   Result Value Ref Range    LA Volume 34.8 18 - 58 mL    LV E' Lateral Velocity 12.00 centimeter/second    LV E' Septal Velocity 9.77 centimeter/second    Tapse 1.56 1.5 - 2.0 cm    Ao Root D 3.65 cm    AO ASC D 2.86 cm    Aortic Valve Systolic Peak Velocity 433.50 cm/s    Aortic Valve Area by Continuity of Peak Velocity 2.5 cm2    AoV PG 5.2 mmHg    LVIDd 4.97 4.2 - 5.9 cm    LVPWd 0.74 0.6 - 1.0 cm    LVIDs 3.51 cm    IVSd 0.67 0.6 - 1.0 cm    LVOT d 2.27 cm    LVOT Peak Velocity 71.55 cm/s    LVOT Peak Gradient 2.0 mmHg    MVA (PHT) 10.4 cm2    MV A K[place]on 57.28 cm/s    MV E [place] 78.58 cm/s    MV E/A 1.40     Left Atrium to Aortic Root Ratio 0.86     RVIDd 3.02 cm    Inferior Vena Cava Diameter Sniffing 1.74 cm    LA Vol 4C 31.58 18 - 58 mL    LA Vol 2C 35.71 18 - 58 mL    LA Area 4C 13.4 cm2    LV Mass .9 88 - 224 g    LV Mass AL Index 58.2 49 - 115 g/m2    RVSP 27.3 mmHg    Est. RA Pressure 3.0 mmHg    Mitral Valve E Wave Deceleration Time 72.8 ms    Mitral Valve Pressure Half-time 21.1 ms    Left Atrium Major Axis 3.14 cm    Triscuspid Valve Regurgitation Peak Gradient 24.3 mmHg    TR Max Velocity 246.60 cm/s    LA Vol Index 17.96 16 - 28 ml/m2    PASP 27.3 mmHg    LA Vol Index 18.43 16 - 28 ml/m2    LA Vol Index 16.29 16 - 28 ml/m2    MARIZA/BSA Pk Kameron 1.3 cm2/m2    Left Ventricular Fractional Shortening by 2D 77.752032709 %    Mitral Valve Deceleration [place] 22.374254197417     AV Velocity Ratio 0.63     Left Ventricular End Diastolic Volume by Teichholz Method 11.120428153 mL    Left Ventricular End Systolic Volume by Teichholz Method 33.289318491 mL    Left Ventricular Stroke Volume by Gerri Esters Method 68.938860741 mL   SAMPLES BEING HELD    Collection Time: 03/07/20 11:58 AM   Result Value Ref Range    SAMPLES BEING HELD 1PST,1LAV     COMMENT        Add-on orders for these samples will be processed based on acceptable specimen integrity and analyte stability, which may vary by analyte. HGB & HCT    Collection Time: 03/07/20 11:58 AM   Result Value Ref Range    HGB 14.6 12.1 - 17.0 g/dL    HCT 43.7 36.6 - 50.3 %   TROPONIN I    Collection Time: 03/07/20 11:58 AM   Result Value Ref Range    Troponin-I, Qt. <0.05 <0.05 ng/mL         Assessment/Plan:     Active Problems:    TIA (transient ischemic attack) (3/6/2020)         See above narrative for full detail.

## 2020-03-07 NOTE — PROGRESS NOTES
Problem: Falls - Risk of  Goal: *Absence of Falls  Description  Document Joan Zazueta Fall Risk and appropriate interventions in the flowsheet.   Outcome: Progressing Towards Goal  Note: Fall Risk Interventions:            Medication Interventions: Patient to call before getting OOB, Teach patient to arise slowly                   Problem: Patient Education: Go to Patient Education Activity  Goal: Patient/Family Education  Outcome: Progressing Towards Goal     Problem: General Medical Care Plan  Goal: *Vital signs within specified parameters  Outcome: Progressing Towards Goal  Goal: *Labs within defined limits  Outcome: Progressing Towards Goal  Goal: *Absence of infection signs and symptoms  Outcome: Progressing Towards Goal  Goal: *Optimal pain control at patient's stated goal  Outcome: Progressing Towards Goal  Goal: *Skin integrity maintained  Outcome: Progressing Towards Goal  Goal: *Fluid volume balance  Outcome: Progressing Towards Goal  Goal: *Optimize nutritional status  Outcome: Progressing Towards Goal  Goal: *Anxiety reduced or absent  Outcome: Progressing Towards Goal

## 2020-03-07 NOTE — PROGRESS NOTES
Patient rang out concerned about his HR. It was elevated to 116 when I arrived in the room and recovered quickly to the 90s. No other symptoms other than self declared anxiety. Paged on call physician, Dr. David Gomez. Awaiting new orders.

## 2020-03-07 NOTE — PROGRESS NOTES
Daily Progress Note and Discharge Note: 3/7/2020  Jane Reeves DO    Assessment/Plan:   Possible Transient ischemic attack:   - Started aspirin, statin  - Neuro saw and cleared for DC  - work up negative    Hx of Dark stools:   vague about his symptoms.   - Gastroenterology consulted, resume home Nexium daily  - H/H trending ok  - Follow up outpt with GI    3. Elevated liver function tests:  minor. Likely fatty liver. - hepatitis panel.   - CT of the abdomen and pelvis ok. 4.  Chronic back pain:  Continue to monitor. Fall precautions. 5.  Dehydration:  resolved       Problem List:  Problem List as of 3/7/2020 Date Reviewed: 3/28/2019          Codes Class Noted - Resolved    TIA (transient ischemic attack) ICD-10-CM: G45.9  ICD-9-CM: 435.9  3/6/2020 - Present        Chronic pain ICD-10-CM: G89.29  ICD-9-CM: 338.29  Unknown - Present    Overview Signed 3/28/2019 11:00 AM by Larissa Ashley MD     back pain             GERD (gastroesophageal reflux disease) ICD-10-CM: K21.9  ICD-9-CM: 530.81  Unknown - Present        Slurred speech ICD-10-CM: R47.81  ICD-9-CM: 784.59  3/28/2019 - Present        Elevated blood pressure reading ICD-10-CM: R03.0  ICD-9-CM: 796.2  3/28/2019 - Present            Subjective:    58-year-old gentleman with past medical history of chronic back pain and GERD, who presents to the hospital with the above-mentioned symptoms. History was primarily obtained from the patient. The patient reports that around 11:30, he noticed some blurred vision in his left eye, soon after that he started having some trouble talking. The patient reports that he also had a headache and felt weak in his left leg and had trouble walking. The patient reports all his symptoms have resolved currently. The patient reports that he also had some dizziness associated with his symptoms, but that has resolved.   The patient reports that he has chronic back pain for which he takes pain medication. Per the patient's family member, the patient has had some blood in his stool in the last week or so, but that seems to have resolved. The patient reports that he has some mild abdominal pain associated with his symptoms, but no other acute concerns or problems. The patient was seen in the ER and was requested to be admitted under the hospitalist service. The patient currently denies any headache, blurry vision, sore throat, trouble swallowing, trouble with speech, any chest pain, shortness of breath, cough, fever, chills, constipation, diarrhea, urinary symptoms, focal or generalized neurological weakness, recent travel, sick contacts, falls, injuries, hematemesis, melena, hemoptysis, hematuria or any other concerns or problems. (Dr Elsa Ramos)    3/7:  No current sx except feels \"just weak. \"  He has had similar symptoms last March and was briefly hospitalized. He wants to eat but is currently NPO. No diff swallowing and no diff with speech. He reports he has had several dark stools but no BRB per rectum. Hb 15.0 today. He has a hx of GERD and takes Nexium regularly. Work up in progress. Neuro to see today. 430pm:  Neuro has seen and cleared for DC. Work up negative. Discussed with pt and he wants to go home. Follow up outpt with PCP early next wk and with Neuro and GI as they direct. Low fat diet and Chol med for likely fatty liver seen on CT. Trigs elevated on lipid panel - further tx per PCP.      Review of Systems:   A comprehensive review of systems was negative except for that written in the HPI. Objective:   Physical Exam:   Visit Vitals  /78 (BP 1 Location: Right arm, BP Patient Position: At rest)   Pulse 75   Temp 99.3 °F (37.4 °C)   Resp 11   Ht 5' 7\" (1.702 m)   Wt 82.1 kg (181 lb)   SpO2 96%   BMI 28.35 kg/m²      O2 Device: Room air    Temp (24hrs), Av.8 °F (37.1 °C), Min:98 °F (36.7 °C), Max:99.6 °F (37.6 °C)    No intake/output data recorded.    1901 - 03/07 0700  In: 893.8 [I.V.:893.8]  Out: -   General:  Alert, cooperative, no distress, appears stated age. Head:  Normocephalic, without obvious abnormality, atraumatic. Eyes:  Conjunctivae/corneas clear. PERRL, EOMs intact. Nose: Nares normal. Septum midline. Mucosa normal. No drainage or sinus tenderness. Throat: Lips, mucosa, and tongue moist..   Neck: Supple, symmetrical, trachea midline, no adenopathy, thyroid: no enlargement/tenderness/nodules, no carotid bruit and no JVD. Back:   Symmetric, no curvature. ROM normal. No CVA tenderness. Lungs:   Clear to auscultation bilaterally. Chest wall:  No tenderness or deformity. Heart:  Regular rate and rhythm, S1, S2 normal, no murmur, click, rub or gallop. Abdomen:   Soft, non-tender. Bowel sounds normal. No masses,  No organomegaly. Extremities: no cyanosis or edema. No calf tenderness or cords. Pulses: 2+ and symmetric all extremities. Skin:  turgor normal. No rashes   Neurologic: CNII-XII intact. Alert and oriented X 3. Fine motor of hands and fingers normal.   equal.  No cogwheeling or rigidity. Gait not tested at this time. Sensation grossly normal to touch. Gross motor of extremities normal.       Data Review:   EXAM: CT ABD PELV WO CONT 3/6/20  INDICATION: abd pain   FINDINGS:   LUNG BASES: There is minor basilar atelectasis. INCIDENTALLY IMAGED HEART AND MEDIASTINUM: Unremarkable. LIVER: No mass or biliary dilatation. There is hepatic steatosis. GALLBLADDER: Unremarkable. SPLEEN: No mass. PANCREAS: No mass or ductal dilatation. ADRENALS: Unremarkable. KIDNEYS/URETERS: No mass, calculus, or hydronephrosis. STOMACH: Unremarkable. SMALL BOWEL: No dilatation or wall thickening. COLON: No dilatation or wall thickening. APPENDIX: Unremarkable. PERITONEUM: No ascites or pneumoperitoneum. RETROPERITONEUM: No lymphadenopathy or aortic aneurysm.   REPRODUCTIVE ORGANS: Prostate is normal in size  URINARY BLADDER: No mass or calculus. BONES: There is spondylolysis at L5 with 2 mm of anterolisthesis of L5 on S1  ADDITIONAL COMMENTS: N/A  IMPRESSION:  1. Hepatic steatosis. 2. No acute intra-abdominal process     MRI 3/6/20  CLINICAL HISTORY: TIA, altered mental status  INDICATION: TIA. COMPARISON: 3/6/2020, 5/20/2019  FINDINGS:   There is no intracranial mass, hemorrhage or evidence of acute infarction. Mucous retention cyst in the right maxillary sinus. The brain architecture is normal. There is no evidence of midline shift or  mass-effect. There are no extra-axial fluid collections. There is no Chiari or  syrinx. The pituitary and infundibulum are grossly unremarkable. There is no  skull base mass. Cerebellopontine angles are grossly unremarkable. The major  intracranial vascular flow-voids are unremarkable. The cavernous sinuses are  symmetric. Optic chiasm and infundibulum grossly unremarkable. Orbits are  grossly symmetric. Dural venous sinuses are grossly patent. The mastoid air cells are well pneumatized and clear. IMPRESSION:  Normal MRI of the brain. No intracranial mass, hemorrhage or evidence of acute infarction. Mucous retention cyst in the right maxillary sinus. Bilateral Carotid Duplex 3/7/20  Interpretation Summary   Findings are consistent with 0-49% stenosis of the right internal carotid and 0-49% stenosis of the left internal carotid. Vertebrals are patent with antegrade flow. Cerebrovascular Findings   Right Carotid   The right CCA is patent. There is intimal thickening present in the right CCA. The right ICA is normal. The right ECA is patent. The right vertebral is antegrade. There is <50% stenosis in the right vertebral artery. The right subclavian has <50% stenosis. Left Carotid   The left CCA is patent. There is intimal thickening present in the left CCA. The left ICA is normal. The left ECA is patent. The left vertebral is antegrade. There is <50% stenosis in the left vertebral artery.  The left subclavian has <50% stenosis. ECHO 3/7/20  Interpretation Summary   Result status: Final result   · Agitated saline contrast study was performed. · Normal cavity size, wall thickness and systolic function (ejection fraction normal). Estimated left ventricular ejection fraction is 55 - 60%. · There was no shunting at baseline, with provocation or with Valsalva. Lab Results   Component Value Date/Time    Cholesterol, total 213 (H) 03/07/2020 03:41 AM    HDL Cholesterol 41 03/07/2020 03:41 AM    LDL, calculated Not calculated due to elevated triglyceride level 03/07/2020 03:41 AM    VLDL, calculated  03/07/2020 03:41 AM     Calculation not valid with this patient's other Lipid values. Triglyceride 402 (H) 03/07/2020 03:41 AM    CHOL/HDL Ratio 5.2 (H) 03/07/2020 03:41 AM     Recent Days:  Recent Labs     03/07/20  0341 03/06/20  1923 03/06/20  1210 03/06/20  0532   WBC  --   --  7.2 8.9   HGB 15.0 14.1 14.6 14.8   HCT 44.0 41.1 42.6 44.2   PLT  --   --  213 236     Recent Labs     03/06/20  1210 03/06/20  0532   * 134*   K 4.2 3.6    100   CO2 23 24   * 107*   BUN 22* 16   CREA 1.02 1.08   CA 8.6 8.9   ALB 4.2 4.1   TBILI 0.6 0.4   SGOT 74* 74*   ALT 97* 96*   INR 1.0  --      No results for input(s): PH, PCO2, PO2, HCO3, FIO2 in the last 72 hours.     24 Hour Results:  Recent Results (from the past 24 hour(s))   GLUCOSE, POC    Collection Time: 03/06/20 11:52 AM   Result Value Ref Range    Glucose (POC) 109 (H) 65 - 100 mg/dL    Performed by Leslie Estrada    CBC WITH AUTOMATED DIFF    Collection Time: 03/06/20 12:10 PM   Result Value Ref Range    WBC 7.2 4.1 - 11.1 K/uL    RBC 4.85 4.10 - 5.70 M/uL    HGB 14.6 12.1 - 17.0 g/dL    HCT 42.6 36.6 - 50.3 %    MCV 87.8 80.0 - 99.0 FL    MCH 30.1 26.0 - 34.0 PG    MCHC 34.3 30.0 - 36.5 g/dL    RDW 12.0 11.5 - 14.5 %    PLATELET 405 295 - 083 K/uL    MPV 11.3 8.9 - 12.9 FL    NRBC 0.0 0  WBC    ABSOLUTE NRBC 0.00 0.00 - 0.01 K/uL    NEUTROPHILS 61 32 - 75 %    LYMPHOCYTES 27 12 - 49 %    MONOCYTES 8 5 - 13 %    EOSINOPHILS 2 0 - 7 %    BASOPHILS 1 0 - 1 %    IMMATURE GRANULOCYTES 1 (H) 0.0 - 0.5 %    ABS. NEUTROPHILS 4.4 1.8 - 8.0 K/UL    ABS. LYMPHOCYTES 1.9 0.8 - 3.5 K/UL    ABS. MONOCYTES 0.6 0.0 - 1.0 K/UL    ABS. EOSINOPHILS 0.1 0.0 - 0.4 K/UL    ABS. BASOPHILS 0.1 0.0 - 0.1 K/UL    ABS. IMM. GRANS. 0.1 (H) 0.00 - 0.04 K/UL    DF AUTOMATED     METABOLIC PANEL, COMPREHENSIVE    Collection Time: 03/06/20 12:10 PM   Result Value Ref Range    Sodium 134 (L) 136 - 145 mmol/L    Potassium 4.2 3.5 - 5.1 mmol/L    Chloride 103 97 - 108 mmol/L    CO2 23 21 - 32 mmol/L    Anion gap 8 5 - 15 mmol/L    Glucose 102 (H) 65 - 100 mg/dL    BUN 22 (H) 6 - 20 MG/DL    Creatinine 1.02 0.70 - 1.30 MG/DL    BUN/Creatinine ratio 22 (H) 12 - 20      GFR est AA >60 >60 ml/min/1.73m2    GFR est non-AA >60 >60 ml/min/1.73m2    Calcium 8.6 8.5 - 10.1 MG/DL    Bilirubin, total 0.6 0.2 - 1.0 MG/DL    ALT (SGPT) 97 (H) 12 - 78 U/L    AST (SGOT) 74 (H) 15 - 37 U/L    Alk. phosphatase 65 45 - 117 U/L    Protein, total 7.5 6.4 - 8.2 g/dL    Albumin 4.2 3.5 - 5.0 g/dL    Globulin 3.3 2.0 - 4.0 g/dL    A-G Ratio 1.3 1.1 - 2.2     TROPONIN I    Collection Time: 03/06/20 12:10 PM   Result Value Ref Range    Troponin-I, Qt. <0.05 <0.05 ng/mL   PROTHROMBIN TIME + INR    Collection Time: 03/06/20 12:10 PM   Result Value Ref Range    INR 1.0 0.9 - 1.1      Prothrombin time 10.2 9.0 - 11.1 sec   PTT    Collection Time: 03/06/20 12:10 PM   Result Value Ref Range    aPTT 25.6 22.1 - 32.0 sec    aPTT, therapeutic range     58.0 - 77.0 SECS   SAMPLES BEING HELD    Collection Time: 03/06/20 12:10 PM   Result Value Ref Range    SAMPLES BEING HELD 1RED,1SST     COMMENT        Add-on orders for these samples will be processed based on acceptable specimen integrity and analyte stability, which may vary by analyte.    LIPASE    Collection Time: 03/06/20 12:10 PM   Result Value Ref Range    Lipase 311 73 - 393 U/L   ACETAMINOPHEN    Collection Time: 03/06/20 12:10 PM   Result Value Ref Range    Acetaminophen level 5 (L) 10 - 30 ug/mL   HEPATITIS PANEL, ACUTE    Collection Time: 03/06/20 12:10 PM   Result Value Ref Range    Hepatitis A, IgM NONREACTIVE NR      __          Hepatitis B surface Ag 0.26 Index    Hep B surface Ag Interp. NEGATIVE  NEG      __          Hepatitis B core, IgM NONREACTIVE NR      __          Hep C  virus Ab Interp.  NONREACTIVE NR      Hep C  virus Ab comment Method used is Siemens Advia Centaur     EKG, 12 LEAD, INITIAL    Collection Time: 03/06/20  1:08 PM   Result Value Ref Range    Ventricular Rate 88 BPM    Atrial Rate 88 BPM    P-R Interval 134 ms    QRS Duration 76 ms    Q-T Interval 362 ms    QTC Calculation (Bezet) 438 ms    Calculated P Axis 24 degrees    Calculated R Axis 25 degrees    Calculated T Axis 5 degrees    Diagnosis       Normal sinus rhythm  Normal ECG  When compared with ECG of 28-MAR-2019 09:53,  No significant change was found     DRUG SCREEN, URINE    Collection Time: 03/06/20  3:30 PM   Result Value Ref Range    AMPHETAMINES NEGATIVE  NEG      BARBITURATES NEGATIVE  NEG      BENZODIAZEPINES NEGATIVE  NEG      COCAINE NEGATIVE  NEG      METHADONE NEGATIVE  NEG      OPIATES NEGATIVE  NEG      PCP(PHENCYCLIDINE) NEGATIVE  NEG      THC (TH-CANNABINOL) NEGATIVE  NEG      Drug screen comment (NOTE)    HGB & HCT    Collection Time: 03/06/20  7:23 PM   Result Value Ref Range    HGB 14.1 12.1 - 17.0 g/dL    HCT 41.1 36.6 - 50.3 %   TROPONIN I    Collection Time: 03/06/20  7:23 PM   Result Value Ref Range    Troponin-I, Qt. <0.05 <0.05 ng/mL   TROPONIN I    Collection Time: 03/07/20  3:41 AM   Result Value Ref Range    Troponin-I, Qt. <0.05 <0.05 ng/mL   HGB & HCT    Collection Time: 03/07/20  3:41 AM   Result Value Ref Range    HGB 15.0 12.1 - 17.0 g/dL    HCT 44.0 36.6 - 50.3 %   OCCULT BLOOD, STOOL    Collection Time: 03/07/20  3:50 AM   Result Value Ref Range    Occult blood, stool NEGATIVE  NEG     ECHO ADULT COMPLETE    Collection Time: 03/07/20  7:53 AM   Result Value Ref Range    LA Volume 38.02 18 - 58 mL    LV E' Lateral Velocity 12.00 centimeter/second    LV E' Septal Velocity 9.77 centimeter/second    Tapse 1.56 1.5 - 2.0 cm    Ao Root D 3.65 cm       Medications reviewed  Current Facility-Administered Medications   Medication Dose Route Frequency    ALPRAZolam (XANAX) tablet 1 mg  1 mg Oral DAILY    therapeutic multivitamin (THERAGRAN) tablet 1 Tab  1 Tab Oral DAILY    acetaminophen (TYLENOL) tablet 650 mg  650 mg Oral Q4H PRN    Or    acetaminophen (TYLENOL) solution 650 mg  650 mg Per NG tube Q4H PRN    Or    acetaminophen (TYLENOL) suppository 650 mg  650 mg Rectal Q4H PRN    0.9% sodium chloride infusion  75 mL/hr IntraVENous CONTINUOUS    aspirin chewable tablet 81 mg  81 mg Oral DAILY    atorvastatin (LIPITOR) tablet 80 mg  80 mg Oral QHS    pantoprazole (PROTONIX) 40 mg in 0.9% sodium chloride 10 mL injection  40 mg IntraVENous Q12H     Current Outpatient Medications   Medication Sig    ALPRAZolam (XANAX) 1 mg tablet Take 1 mg by mouth daily.  therapeutic multivitamin (THERAGRAN) tablet Take 1 Tab by mouth daily. Care Plan discussed with: Patient/Family and Nurse    Total time spent with patient: 30 minutes.     Veronica Melton MD

## 2020-03-07 NOTE — CONSULTS
[address] [place]     Name:  Ad Loja  MR#:  [id number]  :  1980  ACCOUNT #:  [de-identified]  DATE OF SERVICE:  2020    NEUROLOGY CONSULTATION    REQUESTING PHYSICIAN:  Dr. Anette Santizo. HISTORY OF PRESENT ILLNESS:  Thanks for asking me to see the patient in Neurology consultation for an acute change in his neurologic status. This is a 43-year-old gentleman who came to the emergency department after yesterday morning, shortly before noon, he felt that he was not speaking clearly. He said that he had generalized difficulty in moving all of his extremities. He just felt generally weak all over. Denies any one-sided focal hemiparetic-type symptoms. He also though that his vision was blurred in the left eye. He came in for evaluation. He was also complaining of some black stools. He had similar symptoms about a year ago, 2019 and was seen by Dr. Jose Tavares at that time. His evaluation was completely normal.  Since coming to the emergency department, he underwent code S process with a normal head CT. He had CT of the abdomen and pelvis that were unremarkable. Brain MRI, which is personally reviewed is normal.  Laboratory analyses have found elevated LDL at 130. No occult blood. Troponins have been fine. He has significantly elevated triglycerides. Hemoglobin A1c 5.4. Drug screen unremarkable. CBC with hemoglobin of 14.6 and normal coags. Today, he says that he feels much better. He says his symptoms lasted for several hours before they dissipated. He knows of no triggering factor. No changes in medicine and in fact, he says he is on markedly less medicine than he has been on in a number of years. He does have some anxiety. He uses Xanax for that. He has no new complaints today. PAST MEDICAL HISTORY:  Significant for:  1. Anxiety. 2.  Chronic back pain for which he follows with Dr. Jose Dunlap at [facility].   3.  He also has some gastroesophageal reflux. MEDICATIONS AT HOME:  Includes Xanax. MEDICATIONS HERE:  1. Xanax. 2.  Aspirin 81 mg.  3.  Lipitor 80 mg.  4.  Protonix. 5.  Multivitamin. ALLERGIES:  BENADRYL. SOCIAL HISTORY:  He denies smoking. He says he has a drink of alcohol on occasion but nothing regular. Will sometimes use marijuana. FAMILY HISTORY:  Without history of stroke or other major medical problem. REVIEW OF SYSTEMS:  Negative except as stated although he is quite hungry and thirsty as he had some difficulty on his initial stand evaluation that subsequently was repeated and he apparently did fine. Nursing has been in touch communicating with speech pathology and that is all documented in the chart. He did not sleep well last night and again, otherwise, everything is without complaint. PHYSICAL EXAMINATION:  GENERAL:  He is a friendly, anxious-appearing gentleman. VITAL SIGNS:  He is afebrile with the pulse of 70, blood pressure 146/78, oxygen saturation 96% on room air. HEENT:  Sclerae anicteric. Oropharynx clear and moist.  NECK:  Supple. HEART:  Regular. Symmetric pulses. No edema. NEUROLOGIC:  He is awake, alert, oriented, and conversant. Speech and language is normal.  Cognition normal.  Cranial nerves intact II through XII. No nystagmus. No abnormality in bulk or tone. No abnormal movement. No pronation and no drift. He generates full strength in the upper and lower extremities in all muscle groups to direct confrontational testing. Symmetrical reflexes in upper and lower extremities bilaterally without pathologic reflexes. Sensory intact to primary. No ataxia. STUDIES:  As stated. IMPRESSION/PLAN:  A 18-year-old gentlemen with hours' duration of transient neurologic symptoms, all fairly vague and I do not think this is a transient ischemic attack. Discussed that with him at length.   Discussed with the length of time the symptoms persisted and the lack of locality on examination or even lack of locality in terms of complaint and with a normal MRI makes cerebral ischemic event even a transient one markedly less likely. We will, therefore, call this transient neurologic symptoms. Given that I do not think this is a transient ischemic attack, he should not be restricted in his driving as he would if this was a transient ischemic attack. I do, however, think he should modify his modifiable risk factors for stroke and to that end I agree with the aspirin 81 mg provided. Nothing comes with this dark stool issue and certainly with his stable hemoglobin as well as with the occult blood being negative. I think gastrointestinal bleeding is less likely as well, but he does have a Gastroenterology consult. At this juncture, again I will continue the aspirin and the Lipitor. Follow with primary care for those risk factors. I think it would be fine to feed him at this point from a neurologic standpoint. In addition, we will complete his workup with a carotid Doppler to ensure those are fine. His echo has been done and that is unremarkable as well. From my standpoint, follow up as needed and certainly as an outpatient.   Continue to follow with Dr. Nano Barth, his normal neurologist.      Mavis Herrera MD [email or web address]  D:  03/07/2020 13:02  T:  03/07/2020 17:16  JOB #:  [id number]

## 2020-03-07 NOTE — PROGRESS NOTES
Dictated  Unlikely TIA given duration of sxs and nml exam/MRI  rec statin as has been placed and push LDL below 70  ASA 81mg  Check carotids  If no abnormality, no further eval required from my standpoint    Vee Reina MD

## 2020-03-07 NOTE — DISCHARGE SUMMARY
Physician Discharge Summary     Patient ID:    Rosa Isela Anderson  [id number]  70 y.o.  1980  Amparo Lindo DO    Admit date: 3/6/2020  Discharge date and time: 3/7/2020  Admission Diagnoses: Possible TIA (transient ischemic attack) [G45.9]    Discharge Medications:   Current Discharge Medication List      START taking these medications    Details   aspirin 81 mg chewable tablet Take 1 Tab by mouth daily. Get OTC  Qty: 30 Tab, Refills: 0      atorvastatin (LIPITOR) 20 mg tablet Take 1 Tab by mouth nightly. Qty: 30 Tab, Refills: 0      esomeprazole (NEXIUM) 20 mg capsule Take 1 Cap by mouth two (2) times a day. Get otc - may use store brand/generic  Qty: 60 Cap, Refills: 0         CONTINUE these medications which have NOT CHANGED    Details   ALPRAZolam (XANAX) 1 mg tablet Take 1 mg by mouth daily. therapeutic multivitamin (THERAGRAN) tablet Take 1 Tab by mouth daily. Follow up Care:    1. Amparo Lindo DO with in 1 weeks  2.  specialists as directed. Diet:  Low fat, Low cholesterol  Disposition:  Home. Advanced Directive:  Discharge Exam:  [See today's progress note.]  CONSULTATIONS: GI and Neurology    Significant Diagnostic Studies:   Recent Labs     03/07/20  1158 03/07/20  0341  03/06/20  1210 03/06/20  0532   WBC  --   --   --  7.2 8.9   HGB 14.6 15.0   < > 14.6 14.8   HCT 43.7 44.0   < > 42.6 44.2   PLT  --   --   --  213 236    < > = values in this interval not displayed. Recent Labs     03/06/20  1210 03/06/20  0532   * 134*   K 4.2 3.6    100   CO2 23 24   BUN 22* 16   CREA 1.02 1.08   * 107*   CA 8.6 8.9     Recent Labs     03/06/20  1210 03/06/20  0532   SGOT 74* 74*   ALT 97* 96*   AP 65 63   TBILI 0.6 0.4   TP 7.5 7.2   ALB 4.2 4.1   GLOB 3.3 3.1   LPSE 311  --      Recent Labs     03/06/20  1210   INR 1.0   PTP 10.2   APTT 25.6      Lab Results   Component Value Date/Time    TSH 0.60 03/28/2019 11:28 AM       HOSPITAL COURSE & TREATMENT RENDERED:   1.  See today's progress note:  Daily Progress Note and Discharge Note: 3/7/2020  Kenna Gosselin, DO    Assessment/Plan:   Possible Transient ischemic attack:   - Started aspirin, statin  - Neuro saw and cleared for DC  - work up negative    Hx of Dark stools:   vague about his symptoms.   - Gastroenterology consulted, resume home Nexium daily  - H/H trending ok  - Follow up outpt with GI    3. Elevated liver function tests:  minor. Likely fatty liver. - hepatitis panel.   - CT of the abdomen and pelvis ok. 4.  Chronic back pain:  Continue to monitor. Fall precautions. 5.  Dehydration:  resolved       Problem List:  Problem List as of 3/7/2020 Date Reviewed: 3/28/2019          Codes Class Noted - Resolved    TIA (transient ischemic attack) ICD-10-CM: G45.9  ICD-9-CM: 435.9  3/6/2020 - Present        Chronic pain ICD-10-CM: G89.29  ICD-9-CM: 338.29  Unknown - Present    Overview Signed 3/28/2019 11:00 AM by Turner Baker MD     back pain             GERD (gastroesophageal reflux disease) ICD-10-CM: K21.9  ICD-9-CM: 530.81  Unknown - Present        Slurred speech ICD-10-CM: R47.81  ICD-9-CM: 784.59  3/28/2019 - Present        Elevated blood pressure reading ICD-10-CM: R03.0  ICD-9-CM: 796.2  3/28/2019 - Present     Subjective:    43-year-old gentleman with past medical history of chronic back pain and GERD, who presents to the hospital with the above-mentioned symptoms. History was primarily obtained from the patient. The patient reports that around 11:30, he noticed some blurred vision in his left eye, soon after that he started having some trouble talking. The patient reports that he also had a headache and felt weak in his left leg and had trouble walking. The patient reports all his symptoms have resolved currently. The patient reports that he also had some dizziness associated with his symptoms, but that has resolved.   The patient reports that he has chronic back pain for which he takes pain medication. Per the patient's family member, the patient has had some blood in his stool in the last week or so, but that seems to have resolved. The patient reports that he has some mild abdominal pain associated with his symptoms, but no other acute concerns or problems. The patient was seen in the ER and was requested to be admitted under the hospitalist service. The patient currently denies any headache, blurry vision, sore throat, trouble swallowing, trouble with speech, any chest pain, shortness of breath, cough, fever, chills, constipation, diarrhea, urinary symptoms, focal or generalized neurological weakness, recent travel, sick contacts, falls, injuries, hematemesis, melena, hemoptysis, hematuria or any other concerns or problems. (Dr Daniela Marmolejo)    3/7:  No current sx except feels \"just weak. \"  He has had similar symptoms last March and was briefly hospitalized. He wants to eat but is currently NPO. No diff swallowing and no diff with speech. He reports he has had several dark stools but no BRB per rectum. Hb 15.0 today. He has a hx of GERD and takes Nexium regularly. Work up in progress. Neuro to see today. 430pm:  Neuro has seen and cleared for DC. Work up negative. Discussed with pt and he wants to go home. Follow up outpt with PCP early next wk and with Neuro and GI as they direct. Low fat diet and Chol med for likely fatty liver seen on CT. Trigs elevated on lipid panel - further tx per PCP.      Review of Systems:   A comprehensive review of systems was negative except for that written in the HPI. Objective:   Physical Exam:   Visit Vitals  /78 (BP 1 Location: Right arm, BP Patient Position: At rest)   Pulse 75   Temp 99.3 °F (37.4 °C)   Resp 11   Ht 5' 7\" (1.702 m)   Wt 82.1 kg (181 lb)   SpO2 96%   BMI 28.35 kg/m²      O2 Device: Room air  Temp (24hrs), Av.8 °F (37.1 °C), Min:98 °F (36.7 °C), Max:99.6 °F (37.6 °C)    No intake/output data recorded.     1901 - 03/07 0700  In: 893.8 [I.V.:893.8]  Out: -   General:  Alert, cooperative, no distress, appears stated age. Head:  Normocephalic, without obvious abnormality, atraumatic. Eyes:  Conjunctivae/corneas clear. PERRL, EOMs intact. Nose: Nares normal. Septum midline. Mucosa normal. No drainage or sinus tenderness. Throat: Lips, mucosa, and tongue moist..   Neck: Supple, symmetrical, trachea midline, no adenopathy, thyroid: no enlargement/tenderness/nodules, no carotid bruit and no JVD. Back:   Symmetric, no curvature. ROM normal. No CVA tenderness. Lungs:   Clear to auscultation bilaterally. Chest wall:  No tenderness or deformity. Heart:  Regular rate and rhythm, S1, S2 normal, no murmur, click, rub or gallop. Abdomen:   Soft, non-tender. Bowel sounds normal. No masses,  No organomegaly. Extremities: no cyanosis or edema. No calf tenderness or cords. Pulses: 2+ and symmetric all extremities. Skin:  turgor normal. No rashes   Neurologic: CNII-XII intact. Alert and oriented X 3. Fine motor of hands and fingers normal.   equal.  No cogwheeling or rigidity. Gait not tested at this time. Sensation grossly normal to touch. Gross motor of extremities normal.       Data Review:   EXAM: CT ABD PELV WO CONT 3/6/20  INDICATION: abd pain   FINDINGS:   LUNG BASES: There is minor basilar atelectasis. INCIDENTALLY IMAGED HEART AND MEDIASTINUM: Unremarkable. LIVER: No mass or biliary dilatation. There is hepatic steatosis. GALLBLADDER: Unremarkable. SPLEEN: No mass. PANCREAS: No mass or ductal dilatation. ADRENALS: Unremarkable. KIDNEYS/URETERS: No mass, calculus, or hydronephrosis. STOMACH: Unremarkable. SMALL BOWEL: No dilatation or wall thickening. COLON: No dilatation or wall thickening. APPENDIX: Unremarkable. PERITONEUM: No ascites or pneumoperitoneum. RETROPERITONEUM: No lymphadenopathy or aortic aneurysm.   REPRODUCTIVE ORGANS: Prostate is normal in size  URINARY BLADDER: No mass or calculus. BONES: There is spondylolysis at L5 with 2 mm of anterolisthesis of L5 on S1  ADDITIONAL COMMENTS: N/A  IMPRESSION:  1. Hepatic steatosis. 2. No acute intra-abdominal process     MRI 3/6/20  CLINICAL HISTORY: TIA, altered mental status  INDICATION: TIA. COMPARISON: 3/6/2020, 5/20/2019  FINDINGS:   There is no intracranial mass, hemorrhage or evidence of acute infarction. Mucous retention cyst in the right maxillary sinus. The brain architecture is normal. There is no evidence of midline shift or  mass-effect. There are no extra-axial fluid collections. There is no Chiari or  syrinx. The pituitary and infundibulum are grossly unremarkable. There is no  skull base mass. Cerebellopontine angles are grossly unremarkable. The major  intracranial vascular flow-voids are unremarkable. The cavernous sinuses are  symmetric. Optic chiasm and infundibulum grossly unremarkable. Orbits are  grossly symmetric. Dural venous sinuses are grossly patent. The mastoid air cells are well pneumatized and clear. IMPRESSION:  Normal MRI of the brain. No intracranial mass, hemorrhage or evidence of acute infarction. Mucous retention cyst in the right maxillary sinus. Bilateral Carotid Duplex 3/7/20  Interpretation Summary   Findings are consistent with 0-49% stenosis of the right internal carotid and 0-49% stenosis of the left internal carotid. Vertebrals are patent with antegrade flow. Cerebrovascular Findings   Right Carotid   The right CCA is patent. There is intimal thickening present in the right CCA. The right ICA is normal. The right ECA is patent. The right vertebral is antegrade. There is <50% stenosis in the right vertebral artery. The right subclavian has <50% stenosis. Left Carotid   The left CCA is patent. There is intimal thickening present in the left CCA. The left ICA is normal. The left ECA is patent. The left vertebral is antegrade. There is <50% stenosis in the left vertebral artery. The left subclavian has <50% stenosis. ECHO 3/7/20  Interpretation Summary   Result status: Final result   · Agitated saline contrast study was performed. · Normal cavity size, wall thickness and systolic function (ejection fraction normal). Estimated left ventricular ejection fraction is 55 - 60%. · There was no shunting at baseline, with provocation or with Valsalva. Lab Results   Component Value Date/Time    Cholesterol, total 213 (H) 03/07/2020 03:41 AM    HDL Cholesterol 41 03/07/2020 03:41 AM    LDL,Direct 130 (H) 03/07/2020 03:41 AM    LDL, calculated Not calculated due to elevated triglyceride level 03/07/2020 03:41 AM    VLDL, calculated  03/07/2020 03:41 AM     Calculation not valid with this patient's other Lipid values.     Triglyceride 402 (H) 03/07/2020 03:41 AM    CHOL/HDL Ratio 5.2 (H) 03/07/2020 03:41 AM       Signed:  No Fong MD  3/7/2020  4:41 PM

## 2020-03-07 NOTE — DISCHARGE INSTRUCTIONS
Patient Discharge Instructions    Cynthia Restrepo / [id number] : 1980    Admitted 3/6/2020 Discharged: 3/7/2020 4:41 PM     ACUTE DIAGNOSES:  TIA (transient ischemic attack) [G45.9]    CHRONIC MEDICAL DIAGNOSES:  Problem List as of 3/7/2020 Date Reviewed: 3/28/2019          Codes Class Noted - Resolved    TIA (transient ischemic attack) ICD-10-CM: G45.9  ICD-9-CM: 435.9  3/6/2020 - Present        Chronic pain ICD-10-CM: G89.29  ICD-9-CM: 338.29  Unknown - Present    Overview Signed 3/28/2019 11:00 AM by Brett Arroyo MD     back pain             GERD (gastroesophageal reflux disease) ICD-10-CM: K21.9  ICD-9-CM: 530.81  Unknown - Present        Slurred speech ICD-10-CM: R47.81  ICD-9-CM: 784.59  3/28/2019 - Present        Elevated blood pressure reading ICD-10-CM: R03.0  ICD-9-CM: 796.2  3/28/2019 - Present              DISCHARGE MEDICATIONS:         · It is important that you take the medication exactly as they are prescribed. · Keep your medication in the bottles provided by the pharmacist and keep a list of the medication names, dosages, and times to be taken in your wallet. · Do not take other medications without consulting your doctor. DIET:  Low fat, Low cholesterol    ACTIVITY: Activity as tolerated    ADDITIONAL INFORMATION: If you experience any of the following symptoms then please call your primary care physician or return to the emergency room if you cannot get hold of your doctor: Fever, chills, nausea, vomiting, diarrhea, change in mentation, falling, bleeding, shortness of breath. FOLLOW UP CARE:  Dr. Covarrubias Pi, Jessica Galvez, DO  you are to call and set up an appointment to see them with in 1 week. Follow-up with specialists at directed by them      Information obtained by :  I understand that if any problems occur once I am at home I am to contact my physician. I understand and acknowledge receipt of the instructions indicated above. Physician's or R.N.'s Signature                                                                  Date/Time                                                                                                                                              Patient or Representative Signature                                                          Date/Time

## 2020-03-07 NOTE — ED NOTES
Patient discharged by this RN. D/C instructions given. Pt educated to take medications as instructed for management at home. Pt verbalized understanding, verbalized no questions. PIV removed, pressure dressing applied. Pt ambulated out of ER without difficulty, NAD.   Patient Vitals for the past 4 hrs:   Pulse Resp BP SpO2   03/07/20 1600 75 13 133/77 96 %   03/07/20 1500 77 12 135/75 93 %   03/07/20 1400 67 12 141/83 96 %

## 2020-03-08 LAB
ATRIAL RATE: 88 BPM
CALCULATED P AXIS, ECG09: 24 DEGREES
CALCULATED R AXIS, ECG10: 25 DEGREES
CALCULATED T AXIS, ECG11: 5 DEGREES
DIAGNOSIS, 93000: NORMAL
P-R INTERVAL, ECG05: 134 MS
Q-T INTERVAL, ECG07: 362 MS
QRS DURATION, ECG06: 76 MS
QTC CALCULATION (BEZET), ECG08: 438 MS
VENTRICULAR RATE, ECG03: 88 BPM

## 2020-03-16 LAB
LEFT CCA DIST DIAS: 21.5 CM/S
LEFT CCA DIST SYS: 78.7 CM/S
LEFT CCA PROX DIAS: 24 CM/S
LEFT CCA PROX SYS: 113 CM/S
LEFT ECA DIAS: 17.54 CM/S
LEFT ECA SYS: 80.6 CM/S
LEFT ICA DIST DIAS: 25.8 CM/S
LEFT ICA DIST SYS: 62 CM/S
LEFT ICA MID DIAS: 24.5 CM/S
LEFT ICA MID SYS: 62 CM/S
LEFT ICA PROX DIAS: 20.6 CM/S
LEFT ICA PROX SYS: 55.5 CM/S
LEFT ICA/CCA SYS: 0.79
LEFT SUBCLAVIAN DIAS: 0 CM/S
LEFT SUBCLAVIAN SYS: 178.3 CM/S
LEFT VERTEBRAL DIAS: 16.69 CM/S
LEFT VERTEBRAL SYS: 42.6 CM/S
RIGHT CCA DIST DIAS: 21.5 CM/S
RIGHT CCA DIST SYS: 82.6 CM/S
RIGHT CCA PROX DIAS: 13.6 CM/S
RIGHT CCA PROX SYS: 68.8 CM/S
RIGHT ECA DIAS: 12.81 CM/S
RIGHT ECA SYS: 64.6 CM/S
RIGHT ICA DIST DIAS: 23.2 CM/S
RIGHT ICA DIST SYS: 56.8 CM/S
RIGHT ICA MID DIAS: 23.2 CM/S
RIGHT ICA MID SYS: 55.5 CM/S
RIGHT ICA PROX DIAS: 18 CM/S
RIGHT ICA PROX SYS: 60.7 CM/S
RIGHT ICA/CCA SYS: 0.7
RIGHT SUBCLAVIAN DIAS: 0 CM/S
RIGHT SUBCLAVIAN SYS: 169.7 CM/S
RIGHT VERTEBRAL DIAS: 20.58 CM/S
RIGHT VERTEBRAL SYS: 49.1 CM/S

## 2020-04-29 ENCOUNTER — HOSPITAL ENCOUNTER (EMERGENCY)
Age: 40
Discharge: HOME | End: 2020-04-29
Attending: STUDENT IN AN ORGANIZED HEALTH CARE EDUCATION/TRAINING PROGRAM | Admitting: STUDENT IN AN ORGANIZED HEALTH CARE EDUCATION/TRAINING PROGRAM
Payer: MEDICAID

## 2020-04-29 VITALS
HEIGHT: 67 IN | DIASTOLIC BLOOD PRESSURE: 108 MMHG | WEIGHT: 172 LBS | SYSTOLIC BLOOD PRESSURE: 150 MMHG | RESPIRATION RATE: 16 BRPM | OXYGEN SATURATION: 100 % | BODY MASS INDEX: 27 KG/M2 | TEMPERATURE: 98.1 F | HEART RATE: 90 BPM

## 2020-04-29 DIAGNOSIS — M54.41 RIGHT-SIDED LOW BACK PAIN WITH RIGHT-SIDED SCIATICA, UNSPECIFIED CHRONICITY: Primary | ICD-10-CM

## 2020-04-29 PROCEDURE — 99282 EMERGENCY DEPT VISIT SF MDM: CPT

## 2020-04-29 RX ORDER — NAPROXEN 500 MG/1
500 TABLET ORAL 2 TIMES DAILY WITH MEALS
Qty: 14 TAB | Refills: 0 | Status: SHIPPED | OUTPATIENT
Start: 2020-04-29 | End: 2020-05-06

## 2020-04-29 RX ORDER — LIDOCAINE 50 MG/G
PATCH TOPICAL
Qty: 1 EACH | Refills: 0 | Status: SHIPPED | OUTPATIENT
Start: 2020-04-29

## 2020-04-29 NOTE — ED TRIAGE NOTES
Patient reports he has chronic back pain- reports he fell 2 weeks ago which made his back pain worse. Patient reports his neurologist called in prednisone which he has finished. Reports it didn't help; presents to the ED for evaluation. Patient is well appearing and in no acute distress. Patient ambulated to triage with a steady gait.

## 2020-04-29 NOTE — ED PROVIDER NOTES
Back Pain    This is a recurrent problem. The current episode started more than 1 week ago. The problem has not changed since onset. The problem occurs constantly. Patient reports not work related injury. The pain is associated with a remote injury (2 weeks ago, GLF). The pain is present in the right side and gluteal region. The quality of the pain is described as shooting. The pain radiates to the right thigh. The pain is at a severity of 6/10. The pain is moderate. The symptoms are aggravated by twisting and bending. The pain is the same all the time. Stiffness is present all day. Associated symptoms include leg pain. Pertinent negatives include no chest pain, no fever, no numbness, no headaches, no abdominal pain, no bladder incontinence, no paresthesias, no paresis and no weakness. Treatments tried: prednisone pack that was prescribed by patient's neurologist. The treatment provided mild relief. Risk factors include a history of steroid use.  The patient's surgical history non-contributory        Past Medical History:   Diagnosis Date    Chronic pain     back pain    GERD (gastroesophageal reflux disease)        Past Surgical History:   Procedure Laterality Date    HX HERNIA REPAIR Left     inguinal hernia repair    HX HERNIA REPAIR      as an infant         Family History:   Problem Relation Age of Onset    No Known Problems Mother     No Known Problems Father        Social History     Socioeconomic History    Marital status: SINGLE     Spouse name: Not on file    Number of children: Not on file    Years of education: Not on file    Highest education level: Not on file   Occupational History    Not on file   Social Needs    Financial resource strain: Not on file    Food insecurity     Worry: Not on file     Inability: Not on file    Transportation needs     Medical: Not on file     Non-medical: Not on file   Tobacco Use    Smoking status: Former Smoker     Years: 15.00    Smokeless tobacco: Never Used    Tobacco comment: 1 pack/week   Substance and Sexual Activity    Alcohol use: Yes     Alcohol/week: 0.0 standard drinks     Types: 4 - 5 Shots of liquor per week    Drug use: Yes     Types: Marijuana    Sexual activity: Not on file   Lifestyle    Physical activity     Days per week: Not on file     Minutes per session: Not on file    Stress: Not on file   Relationships    Social connections     Talks on phone: Not on file     Gets together: Not on file     Attends Sikh service: Not on file     Active member of club or organization: Not on file     Attends meetings of clubs or organizations: Not on file     Relationship status: Not on file    Intimate partner violence     Fear of current or ex partner: Not on file     Emotionally abused: Not on file     Physically abused: Not on file     Forced sexual activity: Not on file   Other Topics Concern    Not on file   Social History Narrative    Not on file         ALLERGIES: Benadryl [diphenhydramine hcl]    Review of Systems   Constitutional: Negative for chills and fever. Respiratory: Negative for cough and shortness of breath. Cardiovascular: Negative for chest pain. Gastrointestinal: Negative for abdominal pain and vomiting. Genitourinary: Negative for bladder incontinence, difficulty urinating and hematuria. Musculoskeletal: Positive for back pain. Negative for neck pain. Skin: Negative for rash. Neurological: Negative for weakness, numbness, headaches and paresthesias. All other systems reviewed and are negative. Vitals:    04/29/20 0717   BP: (!) 150/108   Pulse: 90   Resp: 16   Temp: 98.1 °F (36.7 °C)   SpO2: 100%   Weight: 78 kg (172 lb)   Height: 5' 7\" (1.702 m)            Physical Exam  Vitals signs and nursing note reviewed. Constitutional:       General: He is not in acute distress. Appearance: He is well-developed. HENT:      Head: Normocephalic and atraumatic.    Eyes:      Conjunctiva/sclera: Conjunctivae normal.   Neck:      Musculoskeletal: Normal range of motion and neck supple. Cardiovascular:      Rate and Rhythm: Normal rate and regular rhythm. Pulmonary:      Effort: Pulmonary effort is normal. No respiratory distress. Abdominal:      Palpations: Abdomen is soft. Tenderness: There is no abdominal tenderness. There is no guarding. Musculoskeletal: Normal range of motion. Lumbar back: He exhibits pain. He exhibits no tenderness, no bony tenderness, no deformity and no laceration. Back:    Skin:     General: Skin is warm and dry. Neurological:      Mental Status: He is alert and oriented to person, place, and time. Motor: No abnormal muscle tone. MDM       Procedures      7:54 AM  The patient presented with acute back pain. The patient is now resting comfortably and feels better, is alert, talkative, interactive and in no distress. The repeat examination is unremarkable and benign. The patient is neurologically intact and is ambulatory in the ED. The patient has no fever, no bowel or bladder incontinence, no saddle anesthesia, and is otherwise alert and well-appearing. The history, physical examination, and diagnostics (if any) do not suggest the presence of acute spinal epidural abscess, acute spinal epidural bleed, cauda equina syndrome, abdominal aortic aneurysm, aortic dissection or other process requiring further testing, treatment or consultation in the emergency department. The vital signs have been stable. The patient's condition is stable and appropriate for discharge. The patient will pursue further outpatient evaluation with the primary care physician or other designated or consulting physician as indicated in the discharge instructions.

## 2020-04-29 NOTE — DISCHARGE INSTRUCTIONS
Patient Education        Learning About Relief for Back Pain  What is back tension and strain? Back strain happens when you overstretch, or pull, a muscle in your back. You may hurt your back in an accident or when you exercise or lift something. Most back pain will get better with rest and time. You can take care of yourself at home to help your back heal.  What can you do first to relieve back pain? When you first feel back pain, try these steps:  · Walk. Take a short walk (10 to 20 minutes) on a level surface (no slopes, hills, or stairs) every 2 to 3 hours. Walk only distances you can manage without pain, especially leg pain. · Relax. Find a comfortable position for rest. Some people are comfortable on the floor or a medium-firm bed with a small pillow under their head and another under their knees. Some people prefer to lie on their side with a pillow between their knees. Don't stay in one position for too long. · Try heat or ice. Try using a heating pad on a low or medium setting, or take a warm shower, for 15 to 20 minutes every 2 to 3 hours. Or you can buy single-use heat wraps that last up to 8 hours. You can also try an ice pack for 10 to 15 minutes every 2 to 3 hours. You can use an ice pack or a bag of frozen vegetables wrapped in a thin towel. There is not strong evidence that either heat or ice will help, but you can try them to see if they help. You may also want to try switching between heat and cold. · Take pain medicine exactly as directed. ¨ If the doctor gave you a prescription medicine for pain, take it as prescribed. ¨ If you are not taking a prescription pain medicine, ask your doctor if you can take an over-the-counter medicine. What else can you do? · Stretch and exercise. Exercises that increase flexibility may relieve your pain and make it easier for your muscles to keep your spine in a good, neutral position. And don't forget to keep walking. · Do self-massage.  You can use self-massage to unwind after work or school or to energize yourself in the morning. You can easily massage your feet, hands, or neck. Self-massage works best if you are in comfortable clothes and are sitting or lying in a comfortable position. Use oil or lotion to massage bare skin. · Reduce stress. Back pain can lead to a vicious [place]: Distress about the pain tenses the muscles in your back, which in turn causes more pain. Learn how to relax your mind and your muscles to lower your stress. Where can you learn more? Go to http://dane-kristen.info/. Enter K609 in the search box to learn more about \"Learning About Relief for Back Pain. \"  Current as of: March 21, 2017  Content Version: 11.5  © 8360-6739 [employer]. Care instructions adapted under license by [employer] (which disclaims liability or warranty for this information). If you have questions about a medical condition or this instruction, always ask your healthcare professional. [address] any warranty or liability for your use of this information.

## 2022-03-18 PROBLEM — R03.0 ELEVATED BLOOD PRESSURE READING: Status: ACTIVE | Noted: 2019-03-28

## 2022-03-18 PROBLEM — G45.9 TIA (TRANSIENT ISCHEMIC ATTACK): Status: ACTIVE | Noted: 2020-03-06

## 2022-03-19 PROBLEM — R47.81 SLURRED SPEECH: Status: ACTIVE | Noted: 2019-03-28

## 2023-05-10 RX ORDER — ATORVASTATIN CALCIUM 20 MG/1
TABLET, FILM COATED ORAL
COMMUNITY
Start: 2020-03-07

## 2023-05-10 RX ORDER — ASPIRIN 81 MG/1
TABLET, CHEWABLE ORAL DAILY
COMMUNITY
Start: 2020-03-08

## 2023-05-10 RX ORDER — ALPRAZOLAM 1 MG/1
1 TABLET ORAL DAILY
COMMUNITY

## 2023-05-10 RX ORDER — LIDOCAINE 50 MG/G
PATCH TOPICAL
COMMUNITY
Start: 2020-04-29